# Patient Record
Sex: FEMALE | Race: OTHER | HISPANIC OR LATINO | Employment: UNEMPLOYED | URBAN - METROPOLITAN AREA
[De-identification: names, ages, dates, MRNs, and addresses within clinical notes are randomized per-mention and may not be internally consistent; named-entity substitution may affect disease eponyms.]

---

## 2019-01-21 ENCOUNTER — HOSPITAL ENCOUNTER (EMERGENCY)
Facility: HOSPITAL | Age: 44
Discharge: HOME | End: 2019-01-21
Attending: EMERGENCY MEDICINE | Admitting: EMERGENCY MEDICINE
Payer: COMMERCIAL

## 2019-01-21 ENCOUNTER — APPOINTMENT (EMERGENCY)
Dept: CT IMAGING | Facility: HOSPITAL | Age: 44
End: 2019-01-21
Payer: COMMERCIAL

## 2019-01-21 VITALS
WEIGHT: 137.79 LBS | DIASTOLIC BLOOD PRESSURE: 56 MMHG | SYSTOLIC BLOOD PRESSURE: 110 MMHG | HEART RATE: 71 BPM | TEMPERATURE: 98.7 F | OXYGEN SATURATION: 100 % | RESPIRATION RATE: 18 BRPM

## 2019-01-21 DIAGNOSIS — D50.9 MICROCYTIC ANEMIA: ICD-10-CM

## 2019-01-21 DIAGNOSIS — R07.9 RIGHT-SIDED CHEST PAIN: Primary | ICD-10-CM

## 2019-01-21 LAB
ALBUMIN SERPL BCP-MCNC: 4.2 G/DL (ref 3.5–5)
ALP SERPL-CCNC: 66 U/L (ref 46–116)
ALT SERPL W P-5'-P-CCNC: 26 U/L (ref 12–78)
ANION GAP SERPL CALCULATED.3IONS-SCNC: 10 MMOL/L (ref 4–13)
AST SERPL W P-5'-P-CCNC: 16 U/L (ref 5–45)
BACTERIA UR QL AUTO: ABNORMAL /HPF
BASOPHILS # BLD AUTO: 0.02 THOUSANDS/ΜL (ref 0–0.1)
BASOPHILS NFR BLD AUTO: 0 % (ref 0–1)
BILIRUB SERPL-MCNC: 0.2 MG/DL (ref 0.2–1)
BILIRUB UR QL STRIP: NEGATIVE
BUN SERPL-MCNC: 18 MG/DL (ref 5–25)
CALCIUM SERPL-MCNC: 8.5 MG/DL (ref 8.3–10.1)
CHLORIDE SERPL-SCNC: 103 MMOL/L (ref 100–108)
CLARITY UR: CLEAR
CO2 SERPL-SCNC: 25 MMOL/L (ref 21–32)
COLOR UR: YELLOW
CREAT SERPL-MCNC: 0.6 MG/DL (ref 0.6–1.3)
EOSINOPHIL # BLD AUTO: 0.04 THOUSAND/ΜL (ref 0–0.61)
EOSINOPHIL NFR BLD AUTO: 1 % (ref 0–6)
ERYTHROCYTE [DISTWIDTH] IN BLOOD BY AUTOMATED COUNT: 17.6 % (ref 11.6–15.1)
EXT PREG TEST URINE: NEGATIVE
GFR SERPL CREATININE-BSD FRML MDRD: 112 ML/MIN/1.73SQ M
GLUCOSE SERPL-MCNC: 108 MG/DL (ref 65–140)
GLUCOSE UR STRIP-MCNC: NEGATIVE MG/DL
HCT VFR BLD AUTO: 28.2 % (ref 34.8–46.1)
HGB BLD-MCNC: 8.4 G/DL (ref 11.5–15.4)
HGB UR QL STRIP.AUTO: ABNORMAL
IMM GRANULOCYTES # BLD AUTO: 0.03 THOUSAND/UL (ref 0–0.2)
IMM GRANULOCYTES NFR BLD AUTO: 0 % (ref 0–2)
KETONES UR STRIP-MCNC: NEGATIVE MG/DL
LEUKOCYTE ESTERASE UR QL STRIP: ABNORMAL
LIPASE SERPL-CCNC: 186 U/L (ref 73–393)
LYMPHOCYTES # BLD AUTO: 1.7 THOUSANDS/ΜL (ref 0.6–4.47)
LYMPHOCYTES NFR BLD AUTO: 23 % (ref 14–44)
MCH RBC QN AUTO: 21.7 PG (ref 26.8–34.3)
MCHC RBC AUTO-ENTMCNC: 29.8 G/DL (ref 31.4–37.4)
MCV RBC AUTO: 73 FL (ref 82–98)
MONOCYTES # BLD AUTO: 0.54 THOUSAND/ΜL (ref 0.17–1.22)
MONOCYTES NFR BLD AUTO: 7 % (ref 4–12)
NEUTROPHILS # BLD AUTO: 5.03 THOUSANDS/ΜL (ref 1.85–7.62)
NEUTS SEG NFR BLD AUTO: 69 % (ref 43–75)
NITRITE UR QL STRIP: NEGATIVE
NON-SQ EPI CELLS URNS QL MICRO: ABNORMAL /HPF
NRBC BLD AUTO-RTO: 0 /100 WBCS
OTHER STN SPEC: ABNORMAL
PH UR STRIP.AUTO: 6.5 [PH] (ref 4.5–8)
PLATELET # BLD AUTO: 416 THOUSANDS/UL (ref 149–390)
PMV BLD AUTO: 10 FL (ref 8.9–12.7)
POTASSIUM SERPL-SCNC: 3.7 MMOL/L (ref 3.5–5.3)
PROT SERPL-MCNC: 7.3 G/DL (ref 6.4–8.2)
PROT UR STRIP-MCNC: NEGATIVE MG/DL
RBC # BLD AUTO: 3.87 MILLION/UL (ref 3.81–5.12)
RBC #/AREA URNS AUTO: ABNORMAL /HPF
SODIUM SERPL-SCNC: 138 MMOL/L (ref 136–145)
SP GR UR STRIP.AUTO: <=1.005 (ref 1–1.03)
UROBILINOGEN UR QL STRIP.AUTO: 0.2 E.U./DL
WBC # BLD AUTO: 7.36 THOUSAND/UL (ref 4.31–10.16)
WBC #/AREA URNS AUTO: ABNORMAL /HPF

## 2019-01-21 PROCEDURE — 99284 EMERGENCY DEPT VISIT MOD MDM: CPT

## 2019-01-21 PROCEDURE — 36415 COLL VENOUS BLD VENIPUNCTURE: CPT | Performed by: EMERGENCY MEDICINE

## 2019-01-21 PROCEDURE — 96361 HYDRATE IV INFUSION ADD-ON: CPT

## 2019-01-21 PROCEDURE — 96374 THER/PROPH/DIAG INJ IV PUSH: CPT

## 2019-01-21 PROCEDURE — 71275 CT ANGIOGRAPHY CHEST: CPT

## 2019-01-21 PROCEDURE — 81001 URINALYSIS AUTO W/SCOPE: CPT | Performed by: EMERGENCY MEDICINE

## 2019-01-21 PROCEDURE — 83690 ASSAY OF LIPASE: CPT | Performed by: EMERGENCY MEDICINE

## 2019-01-21 PROCEDURE — 81025 URINE PREGNANCY TEST: CPT | Performed by: EMERGENCY MEDICINE

## 2019-01-21 PROCEDURE — 85025 COMPLETE CBC W/AUTO DIFF WBC: CPT | Performed by: EMERGENCY MEDICINE

## 2019-01-21 PROCEDURE — 96375 TX/PRO/DX INJ NEW DRUG ADDON: CPT

## 2019-01-21 PROCEDURE — 80053 COMPREHEN METABOLIC PANEL: CPT | Performed by: EMERGENCY MEDICINE

## 2019-01-21 RX ORDER — KETOROLAC TROMETHAMINE 30 MG/ML
15 INJECTION, SOLUTION INTRAMUSCULAR; INTRAVENOUS ONCE
Status: COMPLETED | OUTPATIENT
Start: 2019-01-21 | End: 2019-01-21

## 2019-01-21 RX ORDER — ONDANSETRON 2 MG/ML
4 INJECTION INTRAMUSCULAR; INTRAVENOUS ONCE
Status: COMPLETED | OUTPATIENT
Start: 2019-01-21 | End: 2019-01-21

## 2019-01-21 RX ORDER — FERROUS SULFATE 325(65) MG
325 TABLET ORAL DAILY
Qty: 30 TABLET | Refills: 0 | Status: SHIPPED | OUTPATIENT
Start: 2019-01-21 | End: 2019-02-20

## 2019-01-21 RX ORDER — VALACYCLOVIR HYDROCHLORIDE 1 G/1
1000 TABLET, FILM COATED ORAL 2 TIMES DAILY
COMMUNITY

## 2019-01-21 RX ORDER — NAPROXEN 500 MG/1
250 TABLET ORAL 2 TIMES DAILY WITH MEALS
COMMUNITY

## 2019-01-21 RX ADMIN — ONDANSETRON 4 MG: 2 INJECTION INTRAMUSCULAR; INTRAVENOUS at 18:47

## 2019-01-21 RX ADMIN — KETOROLAC TROMETHAMINE 15 MG: 30 INJECTION, SOLUTION INTRAMUSCULAR at 18:47

## 2019-01-21 RX ADMIN — IOHEXOL 85 ML: 350 INJECTION, SOLUTION INTRAVENOUS at 19:02

## 2019-01-21 RX ADMIN — SODIUM CHLORIDE 1000 ML: 0.9 INJECTION, SOLUTION INTRAVENOUS at 18:10

## 2019-01-21 NOTE — ED NOTES
Patient added she is having increased pain on left side upon inspiration  Started approx 2 weeks ago        Ericka Prince RN  01/21/19 1038

## 2019-01-21 NOTE — ED PROVIDER NOTES
History  Chief Complaint   Patient presents with    Flank Pain     Pt reports R flank pain for 2 weeks  She also reports nausea and jaundice  History provided by:  Patient   used: No    Chest Pain   Pain location:  R lateral chest  Pain quality: aching and pressure    Pain radiates to:  Does not radiate  Pain radiates to the back: no    Pain severity:  Moderate  Onset quality:  Gradual  Duration:  2 weeks  Timing:  Intermittent  Progression:  Waxing and waning  Chronicity:  New  Context: breathing    Relieved by:  None tried  Worsened by:  Nothing tried  Ineffective treatments:  None tried  Associated symptoms: no abdominal pain, no back pain, no cough, no diaphoresis, no dizziness, no dysphagia, no fatigue, no fever, no nausea, no palpitations, no shortness of breath, not vomiting and no weakness    Risk factors: no coronary artery disease and no hypertension        Prior to Admission Medications   Prescriptions Last Dose Informant Patient Reported? Taking?   naproxen (NAPROSYN) 500 mg tablet   Yes Yes   Sig: Take 250 mg by mouth 2 (two) times a day with meals   valACYclovir (VALTREX) 1,000 mg tablet   Yes Yes   Sig: Take 1,000 mg by mouth 2 (two) times a day      Facility-Administered Medications: None       History reviewed  No pertinent past medical history  History reviewed  No pertinent surgical history  History reviewed  No pertinent family history  I have reviewed and agree with the history as documented  Social History   Substance Use Topics    Smoking status: Never Smoker    Smokeless tobacco: Never Used    Alcohol use No        Review of Systems   Constitutional: Negative for activity change, appetite change, chills, diaphoresis, fatigue and fever  HENT: Negative for congestion, dental problem, ear discharge, facial swelling, nosebleeds, rhinorrhea, sinus pressure and trouble swallowing      Eyes: Negative for photophobia, discharge, itching and visual disturbance  Respiratory: Negative for cough, choking, chest tightness and shortness of breath  Cardiovascular: Positive for chest pain  Negative for palpitations and leg swelling  Gastrointestinal: Negative for abdominal distention, abdominal pain, constipation, diarrhea, nausea and vomiting  Endocrine: Negative for polydipsia and polyphagia  Genitourinary: Negative for decreased urine volume, difficulty urinating, dysuria, flank pain, frequency, hematuria, vaginal bleeding and vaginal discharge  Musculoskeletal: Negative for back pain, gait problem, joint swelling, neck pain and neck stiffness  Skin: Negative for color change and rash  Neurological: Negative for dizziness, facial asymmetry, speech difficulty, weakness and light-headedness  Psychiatric/Behavioral: Negative for agitation and behavioral problems  The patient is not nervous/anxious and is not hyperactive  All other systems reviewed and are negative  Physical Exam  Physical Exam   Constitutional: She is oriented to person, place, and time  She appears well-developed and well-nourished  No distress  HENT:   Head: Normocephalic and atraumatic  Eyes: Pupils are equal, round, and reactive to light  EOM are normal    Neck: Normal range of motion  Neck supple  Cardiovascular: Normal rate, regular rhythm and normal heart sounds  No murmur heard  Pulmonary/Chest: Effort normal and breath sounds normal  No respiratory distress  She has no wheezes  She has no rales  Abdominal: Soft  Bowel sounds are normal  She exhibits no distension  There is no tenderness  There is no rebound and no guarding  Musculoskeletal: Normal range of motion  She exhibits no edema or deformity  Tender over right trapezius   Lymphadenopathy:     She has no cervical adenopathy  Neurological: She is alert and oriented to person, place, and time  No cranial nerve deficit  She exhibits normal muscle tone   Coordination normal    Skin: Capillary refill takes less than 2 seconds  No rash noted  No erythema  Psychiatric: She has a normal mood and affect  Her behavior is normal    Nursing note and vitals reviewed        Vital Signs  ED Triage Vitals [01/21/19 1734]   Temperature Pulse Respirations Blood Pressure SpO2   98 7 °F (37 1 °C) 88 18 115/54 100 %      Temp Source Heart Rate Source Patient Position - Orthostatic VS BP Location FiO2 (%)   Oral Monitor Sitting Left arm --      Pain Score       7           Vitals:    01/21/19 1734 01/21/19 1942   BP: 115/54 110/56   Pulse: 88 71   Patient Position - Orthostatic VS: Sitting Lying       Visual Acuity      ED Medications  Medications   sodium chloride 0 9 % bolus 1,000 mL (0 mL Intravenous Stopped 1/21/19 2005)   ketorolac (TORADOL) injection 15 mg (15 mg Intravenous Given 1/21/19 1847)   ondansetron (ZOFRAN) injection 4 mg (4 mg Intravenous Given 1/21/19 1847)   iohexol (OMNIPAQUE) 350 MG/ML injection (MULTI-DOSE) 85 mL (85 mL Intravenous Given 1/21/19 1902)       Diagnostic Studies  Results Reviewed     Procedure Component Value Units Date/Time    Urine Microscopic [[id number]]  (Abnormal) Collected:  01/21/19 1842    Lab Status:  Final result Specimen:  Urine from Urine, Clean Catch Updated:  01/21/19 1906     RBC, UA 0-1 (A) /hpf      WBC, UA None Seen /hpf      Epithelial Cells Occasional /hpf      Bacteria, UA Occasional /hpf      OTHER OBSERVATIONS Transitional Epithelial Cells    UA w Reflex to Microscopic w Reflex to Culture [[id number]]  (Abnormal) Collected:  01/21/19 1842    Lab Status:  Final result Specimen:  Urine from Urine, Clean Catch Updated:  01/21/19 1853     Color, UA Yellow     Clarity, UA Clear     Specific Gravity, UA <=1 005     pH, UA 6 5     Leukocytes, UA Trace (A)     Nitrite, UA Negative     Protein, UA Negative mg/dl      Glucose, UA Negative mg/dl      Ketones, UA Negative mg/dl      Urobilinogen, UA 0 2 E U /dl      Bilirubin, UA Negative     Blood, UA Trace-Intact (A) Comprehensive metabolic panel [[id number]] Collected:  01/21/19 1810    Lab Status:  Final result Specimen:  Blood from Arm, Right Updated:  01/21/19 1848     Sodium 138 mmol/L      Potassium 3 7 mmol/L      Chloride 103 mmol/L      CO2 25 mmol/L      ANION GAP 10 mmol/L      BUN 18 mg/dL      Creatinine 0 60 mg/dL      Glucose 108 mg/dL      Calcium 8 5 mg/dL      AST 16 U/L      ALT 26 U/L      Alkaline Phosphatase 66 U/L      Total Protein 7 3 g/dL      Albumin 4 2 g/dL      Total Bilirubin 0 20 mg/dL      eGFR 112 ml/min/1 73sq m     Narrative:         National Kidney Disease Education Program recommendations are as follows:  GFR calculation is accurate only with a steady state creatinine  Chronic Kidney disease less than 60 ml/min/1 73 sq  meters  Kidney failure less than 15 ml/min/1 73 sq  meters      Lipase [[id number]]  (Normal) Collected:  01/21/19 1810    Lab Status:  Final result Specimen:  Blood from Arm, Right Updated:  01/21/19 1848     Lipase 186 u/L     POCT pregnancy, urine [[id number]]  (Normal) Resulted:  01/21/19 1846    Lab Status:  Final result Updated:  01/21/19 1846     EXT PREG TEST UR (Ref: Negative) negative    CBC and differential [[id number]]  (Abnormal) Collected:  01/21/19 1810    Lab Status:  Final result Specimen:  Blood from Arm, Right Updated:  01/21/19 1826     WBC 7 36 Thousand/uL      RBC 3 87 Million/uL      Hemoglobin 8 4 (L) g/dL      Hematocrit 28 2 (L) %      MCV 73 (L) fL      MCH 21 7 (L) pg      MCHC 29 8 (L) g/dL      RDW 17 6 (H) %      MPV 10 0 fL      Platelets 100 (H) Thousands/uL      nRBC 0 /100 WBCs      Neutrophils Relative 69 %      Immat GRANS % 0 %      Lymphocytes Relative 23 %      Monocytes Relative 7 %      Eosinophils Relative 1 %      Basophils Relative 0 %      Neutrophils Absolute 5 03 Thousands/µL      Immature Grans Absolute 0 03 Thousand/uL      Lymphocytes Absolute 1 70 Thousands/µL      Monocytes Absolute 0 54 Thousand/µL      Eosinophils Absolute 0 04 Thousand/µL      Basophils Absolute 0 02 Thousands/µL                  CTA ED chest PE study   Final Result by Blanco Bishop MD (01/21 1929)      No evidence of pulmonary embolus  Workstation performed: [id number]                    Procedures  Procedures       Phone Contacts  ED Phone Contact    ED Course                               MDM  Number of Diagnoses or Management Options  Microcytic anemia: new and requires workup  Right-sided chest pain: new and requires workup  Diagnosis management comments: Right-sided chest pain that is described as pleuritic in nature  Family history of blood clots, no personal history  Patient denies any abdominal pain, pain on urination, blood in urine, blood in stool  States history of anemia and has been on iron pills in the past     Vital signs stable  Patient overall appears well  Tenderness over the right trapezius muscle  No CVA tenderness, no abdominal tenderness  No left-sided chest pain, no shortness of breath  Does state pleuritic nature of chest pain in setting of significant family history blood counts  CT PE obtained which showed no acute findings  Microcytic anemia  No urinary tract infection, normal lipase and normal CMP  Patient states history of anemia and has been on iron pills in the past   Will restart iron at this time and referred to primary care physician for recheck of hemoglobin  No tachycardia or hypotension, no symptoms of anemia, no history of coronary artery disease, no indication for transfusion  Will prescribe iron pills at home and have patient follow up with primary care physician for recheck and further evaluation as deemed necessary  Patient does state a history of heavy periods which is likely cause of patient's anemia  No active bleeding at this time  Had a long discussion with patient and her daughter regarding ER findings and recommended follow-up    Tylenol recommended for right-sided trapezius pain, likely muscle strain in setting of negative ER workup  No left-sided chest pain or shortness of breath to suggest ACS  Amount and/or Complexity of Data Reviewed  Clinical lab tests: ordered and reviewed  Tests in the radiology section of CPT®: ordered and reviewed  Tests in the medicine section of CPT®: ordered and reviewed    Risk of Complications, Morbidity, and/or Mortality  Presenting problems: moderate  Diagnostic procedures: moderate  Management options: moderate    Patient Progress  Patient progress: stable    CritCare Time    Disposition  Final diagnoses:   Right-sided chest pain   Microcytic anemia     Time reflects when diagnosis was documented in both MDM as applicable and the Disposition within this note     Time User Action Codes Description Comment    1/21/2019  7:52 PM Marylee Marshal Add [R07 9] Right-sided chest pain     1/21/2019  7:52 PM Marylee Marshal Add [D50 9] Microcytic anemia       ED Disposition     ED Disposition Condition Comment    Discharge  [address] [facility] discharge to home/self care  Condition at discharge: Good        Follow-up Information     Follow up With Specialties Details Why Contact Info    Primary care doctor  In 3 days For repeat CBC, microcytic anemia           Discharge Medication List as of 1/21/2019  7:53 PM      START taking these medications    Details   ferrous sulfate 325 (65 Fe) mg tablet Take 1 tablet (325 mg total) by mouth daily for 30 days, Starting Mon 1/21/2019, Until Wed 2/20/2019, Print         CONTINUE these medications which have NOT CHANGED    Details   naproxen (NAPROSYN) 500 mg tablet Take 250 mg by mouth 2 (two) times a day with meals, Historical Med      valACYclovir (VALTREX) 1,000 mg tablet Take 1,000 mg by mouth 2 (two) times a day, Historical Med           No discharge procedures on file      ED Provider  Electronically Signed by           Renate Hudson MD  01/21/19 2046

## 2019-01-22 NOTE — DISCHARGE INSTRUCTIONS
Anemia   LO QUE NECESITA SABER:   La anemia es cuando el número de glóbulos rojos o la cantidad de [employer] glóbulos rojos es baja  La hemoglobina es calvin proteína que ayuda a transportar el oxígeno a través de bland cuerpo  Los glóbulos rojos usan el josh para crear hemoglobina  La anemia podría desarrollarse si bland cuerpo no tiene suficiente josh  También podría desarrollarse si bland cuerpo no produce suficientes glóbulos rojos o si ellos mueren antes de que bland cuerpo pueda producirlos  INSTRUCCIONES SOBRE EL EMMY HOSPITALARIA:   Llame al 911 o kayla que alguien llame al 911 en cualquiera de los siguientes casos:   · Usted pierde el conocimiento  · Usted tiene un dolor intenso en el pecho  Busque atención médica de inmediato si:   · Usted tiene evacuaciones intestinales oscuras o con sadie  Pregúntele a bland Rogue Beady vitaminas y minerales son adecuados para usted  · Sosa síntomas empeoran, aún después del [place]  · Usted tiene preguntas o inquietudes acerca de bland condición o cuidado  Medicamentos:   · Suplementos de josh o ácido fólico  ayudan a aumentar sosa glóbulos rojos y los niveles de hemoglobina  · Inyecciones de vitamina B12  podrían ayudar a aumentar el conteo de sosa glóbulos rojos y a disminuir sosa síntomas  Pregunte a bland médico cómo se inyecta la B12  · [place] sosa medicamentos makeda se le haya indicado  Consulte con bland médico si usted arash que bland medicamento no le está ayudando o si presenta efectos secundarios  Infórmele si es alérgico a algún medicamento  Mantenga calvin lista actualizada de los [place], las vitaminas y los productos herbales que jeison  Incluya los siguientes datos de los medicamentos: cantidad, frecuencia y motivo de administración  Traiga con usted la lista o los envases de la píldoras a sosa citas de seguimiento  Lleve la lista de los medicamentos con usted en bonnie de calvin emergencia    Evite la anemia:  Consuma alimentos sanos altos en josh y vitamina C  Estanislado Ryan, vegetales de hojas myla oscuro y frijoles son altos en josh y proteína  La vitamina C ayuda a escalante cuerpo a absorber el josh  Los [employer] en vitamina C incluyen naranjas y otros cítricos  [place] a escalante médico calvin lista de otros alimentos altos en josh y vitamina C  Pregunte si usted necesita llevar calvin dieta especial    Jaki Mace a venice consultas de control con escalante médico según le indicaron  Anote venice preguntas para que se acuerde de hacerlas abdon venice visitas  © 2017 [address] Information is for End User's use only and may not be sold, redistributed or otherwise used for commercial purposes  All illustrations and images included in CareNotes® are the copyrighted property of A D A M , Inc  or Nomi Bob  Esta información es sólo para uso en educación  Escalante intención no es darle un consejo médico sobre enfermedades o tratamientos  Colsulte con escalante Ozie Sharp farmacéutico antes de seguir cualquier régimen médico para saber si es seguro y efectivo para usted

## 2021-05-07 ENCOUNTER — IMMUNIZATIONS (OUTPATIENT)
Dept: FAMILY MEDICINE CLINIC | Facility: HOSPITAL | Age: 46
End: 2021-05-07

## 2021-05-07 DIAGNOSIS — Z23 ENCOUNTER FOR IMMUNIZATION: Primary | ICD-10-CM

## 2021-05-07 PROCEDURE — 91300 SARS-COV-2 / COVID-19 MRNA VACCINE (PFIZER-BIONTECH) 30 MCG: CPT

## 2021-05-07 PROCEDURE — 0001A SARS-COV-2 / COVID-19 MRNA VACCINE (PFIZER-BIONTECH) 30 MCG: CPT

## 2021-05-29 ENCOUNTER — IMMUNIZATIONS (OUTPATIENT)
Dept: FAMILY MEDICINE CLINIC | Facility: HOSPITAL | Age: 46
End: 2021-05-29

## 2021-05-29 DIAGNOSIS — Z23 ENCOUNTER FOR IMMUNIZATION: Primary | ICD-10-CM

## 2021-05-29 PROCEDURE — 91300 SARS-COV-2 / COVID-19 MRNA VACCINE (PFIZER-BIONTECH) 30 MCG: CPT

## 2021-05-29 PROCEDURE — 0002A SARS-COV-2 / COVID-19 MRNA VACCINE (PFIZER-BIONTECH) 30 MCG: CPT

## 2021-07-01 ENCOUNTER — HOSPITAL ENCOUNTER (EMERGENCY)
Facility: HOSPITAL | Age: 46
Discharge: HOME | End: 2021-07-01
Attending: EMERGENCY MEDICINE
Payer: COMMERCIAL

## 2021-07-01 ENCOUNTER — APPOINTMENT (EMERGENCY)
Dept: CT IMAGING | Facility: HOSPITAL | Age: 46
End: 2021-07-01
Payer: COMMERCIAL

## 2021-07-01 VITALS
RESPIRATION RATE: 18 BRPM | TEMPERATURE: 98 F | HEART RATE: 81 BPM | SYSTOLIC BLOOD PRESSURE: 125 MMHG | HEIGHT: 61 IN | BODY MASS INDEX: 27.94 KG/M2 | DIASTOLIC BLOOD PRESSURE: 71 MMHG | OXYGEN SATURATION: 100 % | WEIGHT: 148 LBS

## 2021-07-01 DIAGNOSIS — K62.89 RECTAL PAIN: Primary | ICD-10-CM

## 2021-07-01 LAB
ALBUMIN SERPL BCP-MCNC: 4.4 G/DL (ref 3.4–4.8)
ALP SERPL-CCNC: 82.5 U/L (ref 35–140)
ALT SERPL W P-5'-P-CCNC: 35 U/L (ref 5–54)
ANION GAP SERPL CALCULATED.3IONS-SCNC: 9 MMOL/L (ref 4–13)
AST SERPL W P-5'-P-CCNC: 27 U/L (ref 15–41)
BASOPHILS # BLD AUTO: 0.01 THOUSANDS/ΜL (ref 0–0.1)
BASOPHILS NFR BLD AUTO: 0 % (ref 0–1)
BILIRUB DIRECT SERPL-MCNC: 0.1 MG/DL (ref 0–0.3)
BILIRUB SERPL-MCNC: 0.41 MG/DL (ref 0.3–1.2)
BUN SERPL-MCNC: 14 MG/DL (ref 6–20)
CALCIUM SERPL-MCNC: 9.2 MG/DL (ref 8.4–10.2)
CHLORIDE SERPL-SCNC: 105 MMOL/L (ref 96–108)
CO2 SERPL-SCNC: 25 MMOL/L (ref 22–33)
CREAT SERPL-MCNC: 0.67 MG/DL (ref 0.4–1.1)
EOSINOPHIL # BLD AUTO: 0.05 THOUSAND/ΜL (ref 0–0.61)
EOSINOPHIL NFR BLD AUTO: 1 % (ref 0–6)
ERYTHROCYTE [DISTWIDTH] IN BLOOD BY AUTOMATED COUNT: 16.8 % (ref 11.6–15.1)
EXT PREG TEST URINE: NEGATIVE
EXT. CONTROL ED NAV: NORMAL
GFR SERPL CREATININE-BSD FRML MDRD: 106 ML/MIN/1.73SQ M
GLUCOSE SERPL-MCNC: 107 MG/DL (ref 65–140)
HCT VFR BLD AUTO: 33.7 % (ref 34.8–46.1)
HGB BLD-MCNC: 10.9 G/DL (ref 11.5–15.4)
IMM GRANULOCYTES # BLD AUTO: 0.01 THOUSAND/UL (ref 0–0.2)
IMM GRANULOCYTES NFR BLD AUTO: 0 % (ref 0–2)
LIPASE SERPL-CCNC: 24 U/L (ref 13–60)
LYMPHOCYTES # BLD AUTO: 2.07 THOUSANDS/ΜL (ref 0.6–4.47)
LYMPHOCYTES NFR BLD AUTO: 32 % (ref 14–44)
MCH RBC QN AUTO: 26.3 PG (ref 26.8–34.3)
MCHC RBC AUTO-ENTMCNC: 32.3 G/DL (ref 31.4–37.4)
MCV RBC AUTO: 81 FL (ref 82–98)
MONOCYTES # BLD AUTO: 0.54 THOUSAND/ΜL (ref 0.17–1.22)
MONOCYTES NFR BLD AUTO: 8 % (ref 4–12)
NEUTROPHILS # BLD AUTO: 3.75 THOUSANDS/ΜL (ref 1.85–7.62)
NEUTS SEG NFR BLD AUTO: 59 % (ref 43–75)
PLATELET # BLD AUTO: 341 THOUSANDS/UL (ref 149–390)
PMV BLD AUTO: 11.1 FL (ref 8.9–12.7)
POTASSIUM SERPL-SCNC: 3.6 MMOL/L (ref 3.5–5)
PROT SERPL-MCNC: 7.4 G/DL (ref 6.4–8.3)
RBC # BLD AUTO: 4.14 MILLION/UL (ref 3.81–5.12)
SODIUM SERPL-SCNC: 139 MMOL/L (ref 133–145)
WBC # BLD AUTO: 6.43 THOUSAND/UL (ref 4.31–10.16)

## 2021-07-01 PROCEDURE — 80076 HEPATIC FUNCTION PANEL: CPT | Performed by: EMERGENCY MEDICINE

## 2021-07-01 PROCEDURE — 85025 COMPLETE CBC W/AUTO DIFF WBC: CPT | Performed by: EMERGENCY MEDICINE

## 2021-07-01 PROCEDURE — 83690 ASSAY OF LIPASE: CPT | Performed by: EMERGENCY MEDICINE

## 2021-07-01 PROCEDURE — 96374 THER/PROPH/DIAG INJ IV PUSH: CPT

## 2021-07-01 PROCEDURE — 36415 COLL VENOUS BLD VENIPUNCTURE: CPT | Performed by: EMERGENCY MEDICINE

## 2021-07-01 PROCEDURE — 99284 EMERGENCY DEPT VISIT MOD MDM: CPT

## 2021-07-01 PROCEDURE — 80048 BASIC METABOLIC PNL TOTAL CA: CPT | Performed by: EMERGENCY MEDICINE

## 2021-07-01 PROCEDURE — 74177 CT ABD & PELVIS W/CONTRAST: CPT

## 2021-07-01 PROCEDURE — 99284 EMERGENCY DEPT VISIT MOD MDM: CPT | Performed by: EMERGENCY MEDICINE

## 2021-07-01 PROCEDURE — 81025 URINE PREGNANCY TEST: CPT | Performed by: EMERGENCY MEDICINE

## 2021-07-01 RX ORDER — POLYETHYLENE GLYCOL 3350 17 G/17G
17 POWDER, FOR SOLUTION ORAL DAILY
Qty: 507 G | Refills: 0 | Status: SHIPPED | OUTPATIENT
Start: 2021-07-01

## 2021-07-01 RX ORDER — LIDOCAINE HYDROCHLORIDE 20 MG/ML
1 JELLY TOPICAL ONCE
Status: COMPLETED | OUTPATIENT
Start: 2021-07-01 | End: 2021-07-01

## 2021-07-01 RX ORDER — KETOROLAC TROMETHAMINE 30 MG/ML
15 INJECTION, SOLUTION INTRAMUSCULAR; INTRAVENOUS ONCE
Status: COMPLETED | OUTPATIENT
Start: 2021-07-01 | End: 2021-07-01

## 2021-07-01 RX ADMIN — IOHEXOL 100 ML: 350 INJECTION, SOLUTION INTRAVENOUS at 17:17

## 2021-07-01 RX ADMIN — LIDOCAINE HYDROCHLORIDE 1 APPLICATION: 20 JELLY TOPICAL at 16:48

## 2021-07-01 RX ADMIN — KETOROLAC TROMETHAMINE 15 MG: 30 INJECTION, SOLUTION INTRAMUSCULAR at 18:05

## 2021-07-01 NOTE — ED PROVIDER NOTES
History  Chief Complaint   Patient presents with    Rectal Pain     Hx of hemmorhoids     51-year-old primarily Pashto-speaking female that presents to the emergency department with rectal pain  Patient states that has been present for >6 months worsening over the last week  Patient has been evaluated by other physicians and diagnosed with an internal hemorrhoid  Seen in January  Has not had surgery  Patient states this is painful  Worse when putting in suppository  Patient has been taking a suppository without relief of her pain as well as naproxen  Worse with defecation  Patient denies any bloody bowel movements, fevers, chills, diarrhea  Patient is having 1 solid bowel movement per day  Patient is not taking any stool softeners  No history of autoimmune diseases  Has history of nonalcoholic steatohepatitis  Last menstrual period was within the last month  Denies dysuria, hematuria, increased frequency of urination, vaginal discharge  History obtained via chart review and  iPad    Objective:  well-appearing 51-year-old female presenting with rectal pain  Patient states the pain is deep  Patient has no external hemorrhoid  I do not see a obvious anal tear on physical examination  There is a small palpable internal hemorrhoid  Normal genitalia  No abdominal tenderness  Clear to auscultation bilaterally  Regular rate and rhythm without murmurs  Skin is without rashes  Pain on palpation of left medial superior buttocks, but no sign of cyst     Assessment/plan: Will evaluate for acute intra-abdominal/intrapelvic processes such as infectious process, mass, fistula, diverticulitis (though no bloody stool)  No obvious external hemorrhoid or tear of the anus that would suggest a cause of her pain  Possible that I am missing a small tear as the patient is quite clenched while I am examining her  No decrease in pain after topical anesthesia   Not likely IBD as no bloody stools  Blood work without significant findings  CT scan with contrast with distended uterus, though probably physiological as upcoming menses  Will have patient follow with OB/Gyn to insure this is not the cause of the pain  Will have patient follow with [place]-rectal surgery for possible small internal hemorrhoid, though the patient's pain is unlikely to be related to an internal hemorrhoid  Possible small anal tear I am missing  Will place patient on stool softner  Return precuations  Given  Abdominal Pain  Pain location: Rectum  Pain quality: sharp    Pain radiates to:  Does not radiate  Pain severity:  Severe  Onset quality:  Gradual  Timing:  Constant  Progression:  Worsening  Chronicity:  Chronic  Relieved by:  Nothing  Worsened by: Bowel movements  Associated symptoms: no chills, no constipation, no fever, no hematuria, no shortness of breath, no vaginal bleeding, no vaginal discharge and no vomiting        Prior to Admission Medications   Prescriptions Last Dose Informant Patient Reported? Taking?   ferrous sulfate 325 (65 Fe) mg tablet   No No   Sig: Take 1 tablet (325 mg total) by mouth daily for 30 days   naproxen (NAPROSYN) 500 mg tablet   Yes No   Sig: Take 250 mg by mouth 2 (two) times a day with meals   valACYclovir (VALTREX) 1,000 mg tablet   Yes No   Sig: Take 1,000 mg by mouth 2 (two) times a day      Facility-Administered Medications: None       History reviewed  No pertinent past medical history  History reviewed  No pertinent surgical history  History reviewed  No pertinent family history  I have reviewed and agree with the history as documented  E-Cigarette/Vaping     E-Cigarette/Vaping Substances     Social History     Tobacco Use    Smoking status: Never Smoker    Smokeless tobacco: Never Used   Substance Use Topics    Alcohol use: No    Drug use: No       Review of Systems   Constitutional: Negative for chills and fever     Respiratory: Negative for shortness of breath  Gastrointestinal: Positive for rectal pain  Negative for constipation and vomiting  Genitourinary: Negative for hematuria, vaginal bleeding and vaginal discharge  All other systems reviewed and are negative  Physical Exam  Physical Exam  Vitals and nursing note reviewed  Constitutional:       General: She is not in acute distress  Appearance: Normal appearance  She is not ill-appearing  HENT:      Head: Normocephalic and atraumatic  Right Ear: External ear normal       Left Ear: External ear normal       Nose: Nose normal       Mouth/Throat:      Mouth: Mucous membranes are moist    Eyes:      General:         Right eye: No discharge  Left eye: No discharge  Conjunctiva/sclera: Conjunctivae normal    Cardiovascular:      Rate and Rhythm: Normal rate and regular rhythm  Pulses: Normal pulses  Heart sounds: No murmur heard  Pulmonary:      Effort: Pulmonary effort is normal       Breath sounds: Normal breath sounds  Abdominal:      General: Abdomen is flat  There is no distension  Tenderness: There is no abdominal tenderness  Genitourinary:     General: Normal vulva  Vagina: No vaginal discharge  Comments: No external hemorrhoid  No anal tear noted, though patient is clenching  Small mass palpated in the rectal vault  No blood  Musculoskeletal:         General: Normal range of motion  Cervical back: Normal range of motion  Skin:     General: Skin is warm  Capillary Refill: Capillary refill takes less than 2 seconds  Findings: No rash  Neurological:      General: No focal deficit present  Mental Status: She is alert  Mental status is at baseline     Psychiatric:         Mood and Affect: Mood normal          Behavior: Behavior normal          Vital Signs  ED Triage Vitals [07/01/21 1607]   Temperature Pulse Respirations Blood Pressure SpO2   98 °F (36 7 °C) 81 18 125/71 100 %      Temp src Heart Rate Source Patient Position - Orthostatic VS BP Location FiO2 (%)   -- -- -- -- --      Pain Score       --           Vitals:    07/01/21 1607   BP: 125/71   Pulse: 81         Visual Acuity      ED Medications  Medications   lidocaine (URO-JET) 2 % urethral/mucosal gel 1 application (1 application Topical Given 7/1/21 1648)   iohexol (OMNIPAQUE) 350 MG/ML injection (MULTI-DOSE) 100 mL (100 mL Intravenous Given 7/1/21 1717)   ketorolac (TORADOL) injection 15 mg (15 mg Intravenous Given 7/1/21 1805)       Diagnostic Studies  Results Reviewed     Procedure Component Value Units Date/Time    Basic metabolic panel [[id number]] Collected: 07/01/21 1640    Lab Status: Final result Specimen: Blood from Arm, Right Updated: 07/01/21 1704     Sodium 139 mmol/L      Potassium 3 6 mmol/L      Chloride 105 mmol/L      CO2 25 mmol/L      ANION GAP 9 mmol/L      BUN 14 mg/dL      Creatinine 0 67 mg/dL      Glucose 107 mg/dL      Calcium 9 2 mg/dL      eGFR 106 ml/min/1 73sq m     Narrative:      Bryan guidelines for Chronic Kidney Disease (CKD):     Stage 1 with normal or high GFR (GFR > 90 mL/min/1 73 square meters)    Stage 2 Mild CKD (GFR = 60-89 mL/min/1 73 square meters)    Stage 3A Moderate CKD (GFR = 45-59 mL/min/1 73 square meters)    Stage 3B Moderate CKD (GFR = 30-44 mL/min/1 73 square meters)    Stage 4 Severe CKD (GFR = 15-29 mL/min/1 73 square meters)    Stage 5 End Stage CKD (GFR <15 mL/min/1 73 square meters)  Note: GFR calculation is accurate only with a steady state creatinine    Hepatic function panel [[id number]]  (Normal) Collected: 07/01/21 1640    Lab Status: Final result Specimen: Blood from Arm, Right Updated: 07/01/21 1704     Total Bilirubin 0 41 mg/dL      Bilirubin, Direct 0 10 mg/dL      Alkaline Phosphatase 82 5 U/L      AST 27 U/L      ALT 35 U/L      Total Protein 7 4 g/dL      Albumin 4 4 g/dL     Lipase [[id number]]  (Normal) Collected: 07/01/21 1640    Lab Status: Final result Specimen: Blood from Arm, Right Updated: 07/01/21 1704     Lipase 24 u/L     CBC and differential [[id number]]  (Abnormal) Collected: 07/01/21 1640    Lab Status: Final result Specimen: Blood from Arm, Right Updated: 07/01/21 1700     WBC 6 43 Thousand/uL      RBC 4 14 Million/uL      Hemoglobin 10 9 g/dL      Hematocrit 33 7 %      MCV 81 fL      MCH 26 3 pg      MCHC 32 3 g/dL      RDW 16 8 %      MPV 11 1 fL      Platelets 433 Thousands/uL      Neutrophils Relative 59 %      Immat GRANS % 0 %      Lymphocytes Relative 32 %      Monocytes Relative 8 %      Eosinophils Relative 1 %      Basophils Relative 0 %      Neutrophils Absolute 3 75 Thousands/µL      Immature Grans Absolute 0 01 Thousand/uL      Lymphocytes Absolute 2 07 Thousands/µL      Monocytes Absolute 0 54 Thousand/µL      Eosinophils Absolute 0 05 Thousand/µL      Basophils Absolute 0 01 Thousands/µL     POCT pregnancy, urine [[id number]]  (Normal) Resulted: 07/01/21 1638    Lab Status: Final result Updated: 07/01/21 1638     EXT PREG TEST UR (Ref: Negative) Negative     Control valid                 CT abdomen pelvis with contrast   Final Result by Lucia Son MD (07/01 1804)      No discrete colonic or rectal wall thickening identified  Endometrial distention with fluid is likely physiologic if patient is premenopausal       Fatty infiltration of the liver  Workstation performed: [id number]                    Procedures  Procedures         ED Course  ED Course as of Jul 02 0135   Thu Jul 01, 2021   1705 Increased from previous     Hemoglobin(!): 10 9                             SBIRT 20yo+      Most Recent Value   SBIRT (25 yo +)   In order to provide better care to our patients, we are screening all of our patients for alcohol and drug use  Would it be okay to ask you these screening questions? Yes Filed at: 07/01/2021 1650   Initial Alcohol Screen: US AUDIT-C    1   How often do you have a drink containing alcohol?  0 Filed at: 07/01/2021 1650   2  How many drinks containing alcohol do you have on a typical day you are drinking? 0 Filed at: 07/01/2021 1650   3a  Male UNDER 65: How often do you have five or more drinks on one occasion? 0 Filed at: 07/01/2021 1650   3b  FEMALE Any Age, or MALE 65+: How often do you have 4 or more drinks on one occassion? 0 Filed at: 07/01/2021 1650   Audit-C Score  0 Filed at: 07/01/2021 1650   KARO: How many times in the past year have you    Used an illegal drug or used a prescription medication for non-medical reasons? Never Filed at: 07/01/2021 1650                    MDM  Number of Diagnoses or Management Options  Rectal pain: new and requires workup     Amount and/or Complexity of Data Reviewed  Clinical lab tests: ordered and reviewed  Tests in the radiology section of CPT®: ordered and reviewed  Review and summarize past medical records: yes  Independent visualization of images, tracings, or specimens: yes    Risk of Complications, Morbidity, and/or Mortality  Presenting problems: moderate  Diagnostic procedures: moderate  Management options: moderate        Disposition  Final diagnoses:   Rectal pain     Time reflects when diagnosis was documented in both MDM as applicable and the Disposition within this note     Time User Action Codes Description Comment    7/1/2021  6:28 PM Isaac Skye Add [K62 89] Rectal pain     7/1/2021  6:31 PM Isaac Skye Add [Q51 9] Uterine anomaly     7/1/2021  6:31 PM Isaac Skye Remove [Q51 9] Uterine anomaly       ED Disposition     ED Disposition Condition Date/Time Comment    Discharge Stable Thu Jul 1, 2021  6:28 PM Thania Greenwood discharge to home/self care              Follow-up Information     Follow up With Specialties Details Why Contact Info Additional Information    Ari Sifuentes MD Colon and Rectal Surgery, Multidisciplinary Schedule an appointment as soon as possible for a visit  regarding your internal hemorrhoid [address]   [address] [facility] Obstetrics and Gynecology Schedule an appointment as soon as possible for a visit  regarding uterine distension [address]  [place] [address] [phone] [address] [facility], [address], [place], [address] [place] [address], [phone]          Discharge Medication List as of 7/1/2021  6:35 PM      START taking these medications    Details   polyethylene glycol (GLYCOLAX) 17 GM/SCOOP powder Take 17 g by mouth daily, Starting u 7/1/2021, Print         CONTINUE these medications which have NOT CHANGED    Details   ferrous sulfate 325 (65 Fe) mg tablet Take 1 tablet (325 mg total) by mouth daily for 30 days, Starting Mon 1/21/2019, Until Wed 2/20/2019, Print      naproxen (NAPROSYN) 500 mg tablet Take 250 mg by mouth 2 (two) times a day with meals, Historical Med      valACYclovir (VALTREX) 1,000 mg tablet Take 1,000 mg by mouth 2 (two) times a day, Historical Med           No discharge procedures on file      PDMP Review     None          ED Provider  Electronically Signed by           Souleymane Del Rio DO  07/02/21 7631

## 2021-07-01 NOTE — DISCHARGE INSTRUCTIONS
Continue taking the naproxen as needed for your pain  You may stop taking the suppositories  Follow-up with Colorectal surgery regarding your internal hemorrhoid  Take MiraLax once daily  Follow-up with OBGYN regarding your uterine distension  This is likely physiologic because you are getting close to having your menses, however I would prefer that she see the OBGYN to exclude that this is a cause of your pain  Come back to emergency department if you have new or worsening symptoms

## 2021-07-02 ENCOUNTER — DOCUMENTATION (OUTPATIENT)
Dept: EMERGENCY DEPT | Facility: HOSPITAL | Age: 46
End: 2021-07-02

## 2021-09-12 ENCOUNTER — HOSPITAL ENCOUNTER (EMERGENCY)
Facility: HOSPITAL | Age: 46
Discharge: HOME | End: 2021-09-12
Attending: EMERGENCY MEDICINE | Admitting: EMERGENCY MEDICINE
Payer: COMMERCIAL

## 2021-09-12 ENCOUNTER — APPOINTMENT (EMERGENCY)
Dept: CT IMAGING | Facility: HOSPITAL | Age: 46
End: 2021-09-12
Payer: COMMERCIAL

## 2021-09-12 VITALS
RESPIRATION RATE: 12 BRPM | SYSTOLIC BLOOD PRESSURE: 138 MMHG | OXYGEN SATURATION: 100 % | HEART RATE: 72 BPM | TEMPERATURE: 97.5 F | DIASTOLIC BLOOD PRESSURE: 70 MMHG

## 2021-09-12 DIAGNOSIS — D50.9 IRON DEFICIENCY ANEMIA, UNSPECIFIED IRON DEFICIENCY ANEMIA TYPE: ICD-10-CM

## 2021-09-12 DIAGNOSIS — G43.909 MIGRAINE HEADACHE: Primary | ICD-10-CM

## 2021-09-12 LAB
ALBUMIN SERPL BCP-MCNC: 4.1 G/DL (ref 3.5–5)
ALP SERPL-CCNC: 72 U/L (ref 46–116)
ALT SERPL W P-5'-P-CCNC: 27 U/L (ref 12–78)
ANION GAP SERPL CALCULATED.3IONS-SCNC: 12 MMOL/L (ref 4–13)
AST SERPL W P-5'-P-CCNC: 19 U/L (ref 5–45)
BASOPHILS # BLD AUTO: 0.02 THOUSANDS/ΜL (ref 0–0.1)
BASOPHILS NFR BLD AUTO: 0 % (ref 0–1)
BILIRUB SERPL-MCNC: 0.33 MG/DL (ref 0.2–1)
BUN SERPL-MCNC: 9 MG/DL (ref 5–25)
CALCIUM SERPL-MCNC: 9.2 MG/DL (ref 8.3–10.1)
CHLORIDE SERPL-SCNC: 105 MMOL/L (ref 100–108)
CO2 SERPL-SCNC: 22 MMOL/L (ref 21–32)
CREAT SERPL-MCNC: 0.57 MG/DL (ref 0.6–1.3)
EOSINOPHIL # BLD AUTO: 0.03 THOUSAND/ΜL (ref 0–0.61)
EOSINOPHIL NFR BLD AUTO: 1 % (ref 0–6)
ERYTHROCYTE [DISTWIDTH] IN BLOOD BY AUTOMATED COUNT: 16.5 % (ref 11.6–15.1)
GFR SERPL CREATININE-BSD FRML MDRD: 112 ML/MIN/1.73SQ M
GLUCOSE SERPL-MCNC: 110 MG/DL (ref 65–140)
GLUCOSE SERPL-MCNC: 95 MG/DL (ref 65–140)
HCG SERPL QL: NEGATIVE
HCT VFR BLD AUTO: 30.3 % (ref 34.8–46.1)
HGB BLD-MCNC: 9.2 G/DL (ref 11.5–15.4)
IMM GRANULOCYTES # BLD AUTO: 0.02 THOUSAND/UL (ref 0–0.2)
IMM GRANULOCYTES NFR BLD AUTO: 0 % (ref 0–2)
LYMPHOCYTES # BLD AUTO: 1.79 THOUSANDS/ΜL (ref 0.6–4.47)
LYMPHOCYTES NFR BLD AUTO: 38 % (ref 14–44)
MCH RBC QN AUTO: 23.1 PG (ref 26.8–34.3)
MCHC RBC AUTO-ENTMCNC: 30.4 G/DL (ref 31.4–37.4)
MCV RBC AUTO: 76 FL (ref 82–98)
MONOCYTES # BLD AUTO: 0.38 THOUSAND/ΜL (ref 0.17–1.22)
MONOCYTES NFR BLD AUTO: 8 % (ref 4–12)
NEUTROPHILS # BLD AUTO: 2.46 THOUSANDS/ΜL (ref 1.85–7.62)
NEUTS SEG NFR BLD AUTO: 53 % (ref 43–75)
NRBC BLD AUTO-RTO: 0 /100 WBCS
PLATELET # BLD AUTO: 417 THOUSANDS/UL (ref 149–390)
PMV BLD AUTO: 9.9 FL (ref 8.9–12.7)
POTASSIUM SERPL-SCNC: 3.4 MMOL/L (ref 3.5–5.3)
PROT SERPL-MCNC: 7.5 G/DL (ref 6.4–8.2)
RBC # BLD AUTO: 3.98 MILLION/UL (ref 3.81–5.12)
SODIUM SERPL-SCNC: 139 MMOL/L (ref 136–145)
TROPONIN I SERPL-MCNC: <0.02 NG/ML
WBC # BLD AUTO: 4.7 THOUSAND/UL (ref 4.31–10.16)

## 2021-09-12 PROCEDURE — 96375 TX/PRO/DX INJ NEW DRUG ADDON: CPT

## 2021-09-12 PROCEDURE — 84703 CHORIONIC GONADOTROPIN ASSAY: CPT | Performed by: PHYSICIAN ASSISTANT

## 2021-09-12 PROCEDURE — 99284 EMERGENCY DEPT VISIT MOD MDM: CPT

## 2021-09-12 PROCEDURE — 80053 COMPREHEN METABOLIC PANEL: CPT | Performed by: EMERGENCY MEDICINE

## 2021-09-12 PROCEDURE — 99284 EMERGENCY DEPT VISIT MOD MDM: CPT | Performed by: PHYSICIAN ASSISTANT

## 2021-09-12 PROCEDURE — 36415 COLL VENOUS BLD VENIPUNCTURE: CPT | Performed by: EMERGENCY MEDICINE

## 2021-09-12 PROCEDURE — 84484 ASSAY OF TROPONIN QUANT: CPT | Performed by: EMERGENCY MEDICINE

## 2021-09-12 PROCEDURE — 82948 REAGENT STRIP/BLOOD GLUCOSE: CPT

## 2021-09-12 PROCEDURE — 85025 COMPLETE CBC W/AUTO DIFF WBC: CPT | Performed by: EMERGENCY MEDICINE

## 2021-09-12 PROCEDURE — 96365 THER/PROPH/DIAG IV INF INIT: CPT

## 2021-09-12 PROCEDURE — 70450 CT HEAD/BRAIN W/O DYE: CPT

## 2021-09-12 PROCEDURE — 93005 ELECTROCARDIOGRAM TRACING: CPT

## 2021-09-12 RX ORDER — KETOROLAC TROMETHAMINE 30 MG/ML
30 INJECTION, SOLUTION INTRAMUSCULAR; INTRAVENOUS ONCE
Status: COMPLETED | OUTPATIENT
Start: 2021-09-12 | End: 2021-09-12

## 2021-09-12 RX ORDER — ACETAMINOPHEN 325 MG/1
650 TABLET ORAL ONCE
Status: DISCONTINUED | OUTPATIENT
Start: 2021-09-12 | End: 2021-09-13 | Stop reason: HOSPADM

## 2021-09-12 RX ORDER — SENNOSIDES 8.6 MG
650 CAPSULE ORAL EVERY 8 HOURS PRN
Qty: 9 TABLET | Refills: 0 | Status: SHIPPED | OUTPATIENT
Start: 2021-09-12 | End: 2021-09-15

## 2021-09-12 RX ORDER — DEXAMETHASONE SODIUM PHOSPHATE 4 MG/ML
10 INJECTION, SOLUTION INTRA-ARTICULAR; INTRALESIONAL; INTRAMUSCULAR; INTRAVENOUS; SOFT TISSUE ONCE
Status: COMPLETED | OUTPATIENT
Start: 2021-09-12 | End: 2021-09-12

## 2021-09-12 RX ORDER — MAGNESIUM SULFATE HEPTAHYDRATE 40 MG/ML
2 INJECTION, SOLUTION INTRAVENOUS ONCE
Status: COMPLETED | OUTPATIENT
Start: 2021-09-12 | End: 2021-09-12

## 2021-09-12 RX ORDER — IBUPROFEN 400 MG/1
400 TABLET ORAL EVERY 6 HOURS PRN
Qty: 12 TABLET | Refills: 0 | Status: SHIPPED | OUTPATIENT
Start: 2021-09-12 | End: 2021-09-15

## 2021-09-12 RX ORDER — METOCLOPRAMIDE HYDROCHLORIDE 5 MG/ML
10 INJECTION INTRAMUSCULAR; INTRAVENOUS ONCE
Status: COMPLETED | OUTPATIENT
Start: 2021-09-12 | End: 2021-09-12

## 2021-09-12 RX ORDER — DIPHENHYDRAMINE HYDROCHLORIDE 50 MG/ML
25 INJECTION INTRAMUSCULAR; INTRAVENOUS ONCE
Status: COMPLETED | OUTPATIENT
Start: 2021-09-12 | End: 2021-09-12

## 2021-09-12 RX ADMIN — MAGNESIUM SULFATE HEPTAHYDRATE 2 G: 40 INJECTION, SOLUTION INTRAVENOUS at 21:34

## 2021-09-12 RX ADMIN — KETOROLAC TROMETHAMINE 30 MG: 30 INJECTION, SOLUTION INTRAMUSCULAR; INTRAVENOUS at 21:26

## 2021-09-12 RX ADMIN — SODIUM CHLORIDE 1000 ML: 0.9 INJECTION, SOLUTION INTRAVENOUS at 21:27

## 2021-09-12 RX ADMIN — DEXAMETHASONE SODIUM PHOSPHATE 10 MG: 4 INJECTION INTRA-ARTICULAR; INTRALESIONAL; INTRAMUSCULAR; INTRAVENOUS; SOFT TISSUE at 21:29

## 2021-09-12 RX ADMIN — DIPHENHYDRAMINE HYDROCHLORIDE 25 MG: 50 INJECTION, SOLUTION INTRAMUSCULAR; INTRAVENOUS at 21:25

## 2021-09-12 RX ADMIN — METOCLOPRAMIDE HYDROCHLORIDE 10 MG: 5 INJECTION INTRAMUSCULAR; INTRAVENOUS at 21:25

## 2021-09-13 LAB
ATRIAL RATE: 82 BPM
QRS AXIS: 19 DEGREES
QRSD INTERVAL: 72 MS
QT INTERVAL: 376 MS
QTC INTERVAL: 423 MS
T WAVE AXIS: 34 DEGREES
VENTRICULAR RATE: 76 BPM

## 2021-09-13 PROCEDURE — 93010 ELECTROCARDIOGRAM REPORT: CPT | Performed by: INTERNAL MEDICINE

## 2021-09-13 NOTE — ED PROVIDER NOTES
History  Chief Complaint   Patient presents with    Headache     pt presents with headache since this morning, per daughter more difficult to arouse  pt answering appropriately when questioned  also reports vomiting     Patient is a 63-year-old female with history of migraines the presents emergency department with gradual onset dull aching frontal head pain with radiation to generalized head for 1 day  Patient has associated symptomatology of nausea vomiting symptoms beginning the current ED presentation head pain  Patient states that she has a history of migraines and current head pain symptomatology do not resemble headaches previously experienced  Patient reports having taking Nexium this morning with patient's daughter that is at bedside, stating that patient had become disoriented", after taking aforementioned medication  Patient daughter denies patient slurred speech, and difficulty in gait  Patient does verbalize bilateral upper extremity tingling sensation but denies numbness and loss of power in all extremities  Patient denies tinnitus, dizziness, meningeal, and vertiginous symptoms  Patient denies recent fall or recent trauma  Patient denies recent sick contacts or recent travel  Patient denies chest pain, shortness of breath, and abdominal pain  History provided by:  Patient   used: No        Prior to Admission Medications   Prescriptions Last Dose Informant Patient Reported?  Taking?   ferrous sulfate 325 (65 Fe) mg tablet   No No   Sig: Take 1 tablet (325 mg total) by mouth daily for 30 days   naproxen (NAPROSYN) 500 mg tablet   Yes No   Sig: Take 250 mg by mouth 2 (two) times a day with meals   polyethylene glycol (GLYCOLAX) 17 GM/SCOOP powder   No No   Sig: Take 17 g by mouth daily   valACYclovir (VALTREX) 1,000 mg tablet   Yes No   Sig: Take 1,000 mg by mouth 2 (two) times a day      Facility-Administered Medications: None       Past Medical History:   Diagnosis Date    Migraine        History reviewed  No pertinent surgical history  History reviewed  No pertinent family history  I have reviewed and agree with the history as documented  E-Cigarette/Vaping     E-Cigarette/Vaping Substances     Social History     Tobacco Use    Smoking status: Never Smoker    Smokeless tobacco: Never Used   Substance Use Topics    Alcohol use: No    Drug use: No       Review of Systems   Constitutional: Negative for activity change, appetite change, chills and fever  HENT: Negative for congestion, postnasal drip, rhinorrhea, sinus pressure, sinus pain, sore throat and tinnitus  Eyes: Negative for photophobia and visual disturbance  Respiratory: Negative for cough, chest tightness and shortness of breath  Cardiovascular: Negative for chest pain and palpitations  Gastrointestinal: Positive for nausea and vomiting  Negative for abdominal pain, constipation and diarrhea  Genitourinary: Negative for difficulty urinating, dysuria, flank pain, frequency and urgency  Musculoskeletal: Negative for back pain, gait problem, neck pain and neck stiffness  Skin: Negative for pallor and rash  Allergic/Immunologic: Negative for environmental allergies and food allergies  Neurological: Positive for headaches  Negative for dizziness, weakness and numbness  Psychiatric/Behavioral: Negative for confusion  All other systems reviewed and are negative  Physical Exam  Physical Exam  Vitals and nursing note reviewed  Constitutional:       General: She is awake  Appearance: Normal appearance  She is well-developed  She is not ill-appearing, toxic-appearing or diaphoretic  Comments: /70 (BP Location: Left arm)   Pulse 72   Temp 97 5 °F (36 4 °C) (Oral)   Resp 12   SpO2 100%      HENT:      Head: Normocephalic and atraumatic  Right Ear: Hearing and external ear normal  No decreased hearing noted  No drainage, swelling or tenderness   No mastoid tenderness  Left Ear: Hearing and external ear normal  No decreased hearing noted  No drainage, swelling or tenderness  No mastoid tenderness  Nose: Nose normal       Mouth/Throat:      Lips: Pink  Mouth: Mucous membranes are moist       Pharynx: Oropharynx is clear  Uvula midline  Eyes:      General: Lids are normal  Vision grossly intact  Right eye: No discharge  Left eye: No discharge  Extraocular Movements: Extraocular movements intact  Conjunctiva/sclera: Conjunctivae normal       Pupils: Pupils are equal, round, and reactive to light  Neck:      Vascular: No JVD  Trachea: Trachea and phonation normal  No tracheal tenderness or tracheal deviation  Comments: No tenderness with passive and active cervical ROM with head turning approximately 45 degree angles to the left and right  No cervical tenderness with cranial axial loading    Cardiovascular:      Rate and Rhythm: Normal rate and regular rhythm  Pulses: Normal pulses  Radial pulses are 2+ on the right side and 2+ on the left side  Posterior tibial pulses are 2+ on the right side and 2+ on the left side  Heart sounds: Normal heart sounds  Pulmonary:      Effort: Pulmonary effort is normal       Breath sounds: Normal breath sounds  No stridor  No decreased breath sounds, wheezing, rhonchi or rales  Chest:      Chest wall: No tenderness  Abdominal:      General: Abdomen is flat  Bowel sounds are normal  There is no distension  Palpations: Abdomen is soft  Abdomen is not rigid  Tenderness: There is no abdominal tenderness  There is no guarding or rebound  Musculoskeletal:         General: Normal range of motion  Cervical back: Full passive range of motion without pain, normal range of motion and neck supple  No rigidity  No spinous process tenderness or muscular tenderness  Normal range of motion        Comments: Passive ROM intact  Upper and lower extremity 4/5 bilaterally  Neurovascularly intact  No grinding or clicking of joints     Lymphadenopathy:      Head:      Right side of head: No submental, submandibular, tonsillar, preauricular, posterior auricular or occipital adenopathy  Left side of head: No submental, submandibular, tonsillar, preauricular, posterior auricular or occipital adenopathy  Cervical: No cervical adenopathy  Right cervical: No superficial, deep or posterior cervical adenopathy  Left cervical: No superficial, deep or posterior cervical adenopathy  Skin:     General: Skin is warm  Capillary Refill: Capillary refill takes less than 2 seconds  Neurological:      General: No focal deficit present  Mental Status: She is alert and oriented to person, place, and time  GCS: GCS eye subscore is 4  GCS verbal subscore is 5  GCS motor subscore is 6  Cranial Nerves: Cranial nerves are intact  Sensory: Sensation is intact  No sensory deficit  Motor: Motor function is intact  Coordination: Coordination is intact  Gait: Gait is intact  Deep Tendon Reflexes: Reflexes are normal and symmetric  Reflex Scores:       Patellar reflexes are 2+ on the right side and 2+ on the left side  Psychiatric:         Mood and Affect: Mood normal          Speech: Speech normal          Behavior: Behavior normal  Behavior is cooperative  Thought Content:  Thought content normal          Judgment: Judgment normal          Vital Signs  ED Triage Vitals   Temperature Pulse Respirations Blood Pressure SpO2   09/12/21 2054 09/12/21 2052 09/12/21 2052 09/12/21 2052 09/12/21 2052   97 5 °F (36 4 °C) 72 12 138/70 100 %      Temp Source Heart Rate Source Patient Position - Orthostatic VS BP Location FiO2 (%)   09/12/21 2054 09/12/21 2052 09/12/21 2052 09/12/21 2052 --   Oral Monitor Sitting Left arm       Pain Score       --                  Vitals:    09/12/21 2052   BP: 138/70   Pulse: 72   Patient Position - Orthostatic VS: Sitting         Visual Acuity      ED Medications  Medications   acetaminophen (TYLENOL) tablet 650 mg (650 mg Oral Not Given 9/12/21 2126)   sodium chloride 0 9 % bolus 1,000 mL (0 mL Intravenous Stopped 9/12/21 2259)   diphenhydrAMINE (BENADRYL) injection 25 mg (25 mg Intravenous Given 9/12/21 2125)   metoclopramide (REGLAN) injection 10 mg (10 mg Intravenous Given 9/12/21 2125)   ketorolac (TORADOL) injection 30 mg (30 mg Intravenous Given 9/12/21 2126)   magnesium sulfate 2 g/50 mL IVPB (premix) 2 g (0 g Intravenous Stopped 9/12/21 2259)   dexamethasone (DECADRON) injection 10 mg (10 mg Intravenous Given 9/12/21 2129)       Diagnostic Studies  Results Reviewed     Procedure Component Value Units Date/Time    hCG, qualitative pregnancy [[id number]]  (Normal) Collected: 09/12/21 2059    Lab Status: Final result Specimen: Blood from Arm, Right Updated: 09/12/21 2209     Preg, Serum Negative    Comprehensive metabolic panel [[id number]]  (Abnormal) Collected: 09/12/21 2059    Lab Status: Final result Specimen: Blood from Arm, Right Updated: 09/12/21 2133     Sodium 139 mmol/L      Potassium 3 4 mmol/L      Chloride 105 mmol/L      CO2 22 mmol/L      ANION GAP 12 mmol/L      BUN 9 mg/dL      Creatinine 0 57 mg/dL      Glucose 110 mg/dL      Calcium 9 2 mg/dL      AST 19 U/L      ALT 27 U/L      Alkaline Phosphatase 72 U/L      Total Protein 7 5 g/dL      Albumin 4 1 g/dL      Total Bilirubin 0 33 mg/dL      eGFR 112 ml/min/1 73sq m     Narrative:      Bryan guidelines for Chronic Kidney Disease (CKD):     Stage 1 with normal or high GFR (GFR > 90 mL/min/1 73 square meters)    Stage 2 Mild CKD (GFR = 60-89 mL/min/1 73 square meters)    Stage 3A Moderate CKD (GFR = 45-59 mL/min/1 73 square meters)    Stage 3B Moderate CKD (GFR = 30-44 mL/min/1 73 square meters)    Stage 4 Severe CKD (GFR = 15-29 mL/min/1 73 square meters)    Stage 5 End Stage CKD (GFR <15 mL/min/1 73 square meters)  Note: GFR calculation is accurate only with a steady state creatinine    Troponin I [[id number]]  (Normal) Collected: 09/12/21 2059    Lab Status: Final result Specimen: Blood from Arm, Right Updated: 09/12/21 2125     Troponin I <0 02 ng/mL     CBC and differential [[id number]]  (Abnormal) Collected: 09/12/21 2059    Lab Status: Final result Specimen: Blood from Arm, Right Updated: 09/12/21 2112     WBC 4 70 Thousand/uL      RBC 3 98 Million/uL      Hemoglobin 9 2 g/dL      Hematocrit 30 3 %      MCV 76 fL      MCH 23 1 pg      MCHC 30 4 g/dL      RDW 16 5 %      MPV 9 9 fL      Platelets 763 Thousands/uL      nRBC 0 /100 WBCs      Neutrophils Relative 53 %      Immat GRANS % 0 %      Lymphocytes Relative 38 %      Monocytes Relative 8 %      Eosinophils Relative 1 %      Basophils Relative 0 %      Neutrophils Absolute 2 46 Thousands/µL      Immature Grans Absolute 0 02 Thousand/uL      Lymphocytes Absolute 1 79 Thousands/µL      Monocytes Absolute 0 38 Thousand/µL      Eosinophils Absolute 0 03 Thousand/µL      Basophils Absolute 0 02 Thousands/µL     Fingerstick Glucose (POCT) [[id number]]  (Normal) Collected: 09/12/21 2102    Lab Status: Final result Updated: 09/12/21 2103     POC Glucose 95 mg/dl     POCT pregnancy, urine [[id number]]     Lab Status: No result                  CT head wo contrast   ED Interpretation by Bárbara Larry PA-C (09/12 2327)   Jos Langston MD  [phone] 9/12/2021     Narrative & Impression  CT BRAIN - WITHOUT CONTRAST     INDICATION:   generalized head pain current headache different from those previously experienced        COMPARISON:  None      TECHNIQUE:  CT examination of the brain was performed  In addition to axial images, sagittal and coronal 2D reformatted images were created and submitted for interpretation      Radiation dose length product (DLP) for this visit:  888 mGy-cm     This examination, like all CT scans performed in the [facility], was performed utilizing techniques to minimize radiation dose exposure, including the use of iterative   reconstruction and automated exposure control        IMAGE QUALITY:  Diagnostic      FINDINGS:     PARENCHYMA:  No intracranial mass, mass effect or midline shift  No CT signs of acute infarction  No acute parenchymal hemorrhage      VENTRICLES AND EXTRA-AXIAL SPACES:  Normal for the patient's age      VISUALIZED ORBITS AND PARANASAL SI   NUSES:  Unremarkable      CALVARIUM AND EXTRACRANIAL SOFT TISSUES:  Normal      IMPRESSION:     No acute intracranial hemorrhage, midline shift, or mass effect                  Workstation performed: [id number]        Final Result by Farideh Sharp MD (09/12 2320)      No acute intracranial hemorrhage, midline shift, or mass effect  Workstation performed: [id number]                    Procedures  ECG 12 Lead Documentation Only    Date/Time: 9/12/2021 8:58 PM  Performed by: Lizz Sanches PA-C  Authorized by: Lizz Sanches PA-C     Indications / Diagnosis:  Headache  ECG reviewed by me, the ED Provider: yes    Patient location:  ED  Previous ECG:     Previous ECG:  Unavailable    Comparison to cardiac monitor: Yes    Interpretation:     Interpretation: normal    Rate:     ECG rate:  76    ECG rate assessment: normal    Rhythm:     Rhythm: sinus rhythm    Ectopy:     Ectopy: none    QRS:     QRS axis:  Normal    QRS intervals:  Normal  Conduction:     Conduction: normal    ST segments:     ST segments:  Normal  T waves:     T waves: normal               ED Course  ED Course as of Sep 13 0121   Sun Sep 12, 2021   2137 Iron deficiency anemia; mcv 76; hb 9 2/hct 30 3      2205 Reassessment of patient with patient neurologically intact no focal deficits; patient verbalizes complete abatement of head pain symptomatology status post medication delivery  2307 Patient states that she has a history of iron deficiency anemia                      Stroke Assessment [address] Name 09/12/21 2113             NIH Stroke Scale    Interval  Baseline      Level of Consciousness (1a )  0      LOC Questions (1b )  0      LOC Commands (1c )  0      Best Gaze (2 )  0      Visual (3 )  0      Facial Palsy (4 )  0      Motor Arm, Left (5a )  0      Motor Arm, Right (5b )  0      Motor Leg, Left (6a )  0      Motor Leg, Right (6b )  0      Limb Ataxia (7 )  0      Sensory (8 )  0      Best Language (9 )  0      Dysarthria (10 )  0      Extinction and Inattention (11 ) (Formerly Neglect)  0      Total  0                                  MDM  Number of Diagnoses or Management Options  Migraine headache: new and does not require workup     Amount and/or Complexity of Data Reviewed  Clinical lab tests: ordered and reviewed  Tests in the radiology section of CPT®: ordered and reviewed  Review and summarize past medical records: yes  Independent visualization of images, tracings, or specimens: yes    Risk of Complications, Morbidity, and/or Mortality  Presenting problems: moderate  Diagnostic procedures: moderate  Management options: low    Patient Progress  Patient progress: stable     Patient is a 14-year-old female with history of migraines the presents emergency department with gradual onset dull aching frontal head pain with radiation to generalized head for 1 day  Patient has associated symptomatology of nausea vomiting symptoms beginning the current ED presentation head pain     Patient hemodynamically stable and afebrile  NIH stroke scale 0; reassessment patient with patient baseline mentation with normal neurological exam no focal deficits  Delivered Toradol, Reglan, Benadryl, Decadron, Tylenol with patient verbalized decrease in head pain symptoms status post medication delivery  HCG qualitative pregnancy negative  ECG with normal sinus rhythm; negative troponin  Normal glucose  Head CT non-contrast-impression-"  Unremarkable electrolytes, normal kidney function  No leukocytosis, no banding  Patient states that she has a history of iron deficiency anemia; MCV 76 with hemoglobin 9 2 with hematocrit 30 3; patient denies hematochezia, hemoptysis, and hematuria; patient also denies alternate aspects of bleeding symptomatology  Prescribed Motrin and Tylenol and counseled patient on medication administration and side effects  Follow-up with Urology, PCP and emergency department symptoms persist or exacerbate  Patient demonstrates verbal understanding all clinical laboratory imaging findings, discharge instructions, follow-up verbalized agreement patient current treatment plan with Bulgarian to English translation via daughter    Disposition  Final diagnoses:   Migraine headache   Iron deficiency anemia, unspecified iron deficiency anemia type     Time reflects when diagnosis was documented in both MDM as applicable and the Disposition within this note     Time User Action Codes Description Comment    9/12/2021 11:28 PM Jackie Sons Add [R42] Dizziness     9/12/2021 11:28 PM Jackie Sons Remove [R42] Dizziness     9/12/2021 11:28 PM Jackie Sons Add [G43 909] Migraine headache     9/12/2021 11:29 PM Jackie Sons Add [D50 9] Iron deficiency anemia, unspecified iron deficiency anemia type       ED Disposition     ED Disposition Condition Date/Time Comment    Discharge Stable Sun Sep 12, 2021 11:30 PM Pierre Natalie discharge to home/self care              Follow-up Information     Follow up With Specialties Details Why Contact Info Additional [address]   [address] [id number]  , [place], [place], [address] Emergency Department Emergency Medicine   [address] Emergency Department, [address], [place], [place], [address] Neurology [facility] Neurology   [address] [facility] [place]  [phone] Neurology [facility], [address] [place], [place], [address]          Discharge Medication List as of 9/12/2021 11:32 PM      START taking these medications    Details   acetaminophen (TYLENOL) 650 mg CR tablet Take 1 tablet (650 mg total) by mouth every 8 (eight) hours as needed for mild pain for up to 3 days, Starting Sun 9/12/2021, Until Wed 9/15/2021 at 2359, Normal      ibuprofen (MOTRIN) 400 mg tablet Take 1 tablet (400 mg total) by mouth every 6 (six) hours as needed for mild pain for up to 3 days, Starting Sun 9/12/2021, Until Wed 9/15/2021 at 2359, Normal         CONTINUE these medications which have NOT CHANGED    Details   ferrous sulfate 325 (65 Fe) mg tablet Take 1 tablet (325 mg total) by mouth daily for 30 days, Starting Mon 1/21/2019, Until Wed 2/20/2019, Print      naproxen (NAPROSYN) 500 mg tablet Take 250 mg by mouth 2 (two) times a day with meals, Historical Med      polyethylene glycol (GLYCOLAX) 17 GM/SCOOP powder Take 17 g by mouth daily, Starting u 7/1/2021, Print      valACYclovir (VALTREX) 1,000 mg tablet Take 1,000 mg by mouth 2 (two) times a day, Historical Med           No discharge procedures on file      PDMP Review     None          ED Provider  Electronically Signed by           Ramez Up PA-C  09/13/21 5725

## 2021-09-13 NOTE — DISCHARGE INSTRUCTIONS
Take Tylenol Motrin as indicated  Follow-up with Neurology, PCP and emergency department if symptoms persist or exacerbate

## 2024-03-31 ENCOUNTER — HOSPITAL ENCOUNTER (EMERGENCY)
Facility: HOSPITAL | Age: 49
Discharge: HOME | End: 2024-03-31
Attending: EMERGENCY MEDICINE
Payer: COMMERCIAL

## 2024-03-31 VITALS
HEIGHT: 62 IN | RESPIRATION RATE: 18 BRPM | TEMPERATURE: 98.2 F | BODY MASS INDEX: 23.81 KG/M2 | WEIGHT: 129.41 LBS | DIASTOLIC BLOOD PRESSURE: 75 MMHG | HEART RATE: 67 BPM | SYSTOLIC BLOOD PRESSURE: 116 MMHG | OXYGEN SATURATION: 97 %

## 2024-03-31 DIAGNOSIS — N39.0 UTI (URINARY TRACT INFECTION): Primary | ICD-10-CM

## 2024-03-31 LAB
BACTERIA UR QL AUTO: NORMAL /HPF
BILIRUB UR QL STRIP: NEGATIVE
CLARITY UR: CLEAR
COLOR UR: ABNORMAL
EXT PREGNANCY TEST URINE: NEGATIVE
EXT. CONTROL: NORMAL
GLUCOSE UR STRIP-MCNC: ABNORMAL MG/DL
HGB UR QL STRIP.AUTO: ABNORMAL
KETONES UR STRIP-MCNC: NEGATIVE MG/DL
LEUKOCYTE ESTERASE UR QL STRIP: NEGATIVE
NITRITE UR QL STRIP: POSITIVE
NON-SQ EPI CELLS URNS QL MICRO: NORMAL /HPF
PH UR STRIP.AUTO: 5.5 [PH]
PROT UR STRIP-MCNC: ABNORMAL MG/DL
RBC #/AREA URNS AUTO: NORMAL /HPF
SP GR UR STRIP.AUTO: >=1.03 (ref 1–1.03)
UROBILINOGEN UR QL STRIP.AUTO: 4 E.U./DL
WBC #/AREA URNS AUTO: NORMAL /HPF

## 2024-03-31 PROCEDURE — 81001 URINALYSIS AUTO W/SCOPE: CPT | Performed by: EMERGENCY MEDICINE

## 2024-03-31 PROCEDURE — 99283 EMERGENCY DEPT VISIT LOW MDM: CPT

## 2024-03-31 PROCEDURE — 81025 URINE PREGNANCY TEST: CPT | Performed by: EMERGENCY MEDICINE

## 2024-03-31 PROCEDURE — 99284 EMERGENCY DEPT VISIT MOD MDM: CPT | Performed by: EMERGENCY MEDICINE

## 2024-03-31 RX ORDER — ACETAMINOPHEN 325 MG/1
650 TABLET ORAL ONCE
Status: COMPLETED | OUTPATIENT
Start: 2024-03-31 | End: 2024-03-31

## 2024-03-31 RX ORDER — SULFAMETHOXAZOLE AND TRIMETHOPRIM 800; 160 MG/1; MG/1
1 TABLET ORAL 2 TIMES DAILY
Qty: 14 TABLET | Refills: 0 | Status: SHIPPED | OUTPATIENT
Start: 2024-03-31 | End: 2024-04-07

## 2024-03-31 RX ORDER — SULFAMETHOXAZOLE AND TRIMETHOPRIM 800; 160 MG/1; MG/1
1 TABLET ORAL ONCE
Status: COMPLETED | OUTPATIENT
Start: 2024-03-31 | End: 2024-03-31

## 2024-03-31 RX ADMIN — ACETAMINOPHEN 650 MG: 325 TABLET, FILM COATED ORAL at 15:58

## 2024-03-31 RX ADMIN — SULFAMETHOXAZOLE AND TRIMETHOPRIM 1 TABLET: 800; 160 TABLET ORAL at 16:38

## 2024-04-02 NOTE — ED PROVIDER NOTES
History  Chief Complaint   Patient presents with    Possible UTI     Reports dysuria, frequency, urgency, symptoms x2 days     This is a 48 presents to the emergency department complaining of frequency urgency and dysuria.  The patient states she had symptoms approximately 1 week ago and was treated with antibiotics.  She states the symptoms have gone away but then have recurred.  She denies fevers or chills.  She denies nausea or vomiting.  She denies back pain.        Prior to Admission Medications   Prescriptions Last Dose Informant Patient Reported? Taking?   ferrous sulfate 325 (65 Fe) mg tablet   No No   Sig: Take 1 tablet (325 mg total) by mouth daily for 30 days   ibuprofen (MOTRIN) 400 mg tablet   No No   Sig: Take 1 tablet (400 mg total) by mouth every 6 (six) hours as needed for mild pain for up to 3 days   naproxen (NAPROSYN) 500 mg tablet   Yes No   Sig: Take 250 mg by mouth 2 (two) times a day with meals   polyethylene glycol (GLYCOLAX) 17 GM/SCOOP powder   No No   Sig: Take 17 g by mouth daily   valACYclovir (VALTREX) 1,000 mg tablet   Yes No   Sig: Take 1,000 mg by mouth 2 (two) times a day      Facility-Administered Medications: None       Past Medical History:   Diagnosis Date    Diabetes mellitus (HCC)     Migraine        History reviewed. No pertinent surgical history.    History reviewed. No pertinent family history.  I have reviewed and agree with the history as documented.    E-Cigarette/Vaping    E-Cigarette Use Never User      E-Cigarette/Vaping Substances     Social History     Tobacco Use    Smoking status: Never    Smokeless tobacco: Never   Vaping Use    Vaping status: Never Used   Substance Use Topics    Alcohol use: No    Drug use: No       Review of Systems   All other systems reviewed and are negative.      Physical Exam  Physical Exam  Constitutional:  Vital signs reviewed, patient appears nontoxic, no acute distress  Eyes: Pupils equal round reactive to light and accommodation,  extraocular muscles intact  HEENT: trachea midline, no JVD, moist mucous membranes  Respiratory: lung sounds clear throughout, no rhonchi, no rales  Cardiovascular: regular rate rhythm, no murmurs or rubs  Abdomen: soft, mild suprapubic tenderness , nondistended, no rebound or guarding  Back: no CVA tenderness, normal inspection  Extremities: no edema, pulses equal in all 4 extremities  Neuro: awake, alert, oriented, no focal weakness  Skin: warm, dry, intact, no rashes noted    Vital Signs  ED Triage Vitals   Temperature Pulse Respirations Blood Pressure SpO2   03/31/24 1534 03/31/24 1534 03/31/24 1534 03/31/24 1536 03/31/24 1535   98.2 °F (36.8 °C) 67 18 116/75 97 %      Temp Source Heart Rate Source Patient Position - Orthostatic VS BP Location FiO2 (%)   03/31/24 1534 03/31/24 1534 03/31/24 1535 03/31/24 1535 --   Oral Monitor Lying Left arm       Pain Score       03/31/24 1535       9           Vitals:    03/31/24 1534 03/31/24 1535 03/31/24 1536   BP:   116/75   Pulse: 67     Patient Position - Orthostatic VS:  Lying          Visual Acuity      ED Medications  Medications   acetaminophen (TYLENOL) tablet 650 mg (650 mg Oral Given 3/31/24 1558)   sulfamethoxazole-trimethoprim (BACTRIM DS) 800-160 mg per tablet 1 tablet (1 tablet Oral Given 3/31/24 1638)       Diagnostic Studies  Results Reviewed       Procedure Component Value Units Date/Time    Urine Microscopic [[id number]]  (Normal) Collected: 03/31/24 1556    Lab Status: Final result Specimen: Urine, Clean Catch Updated: 03/31/24 1615     RBC, UA 0-5 /hpf      WBC, UA 0-5 /hpf      Epithelial Cells Occasional /hpf      Bacteria, UA Occasional /hpf     UA w Reflex to Microscopic w Reflex to Culture [[id number]]  (Abnormal) Collected: 03/31/24 1556    Lab Status: Final result Specimen: Urine, Clean Catch Updated: 03/31/24 1602     Color, UA Orange     Clarity, UA Clear     Specific Gravity, UA >=1.030     pH, UA 5.5     Leukocytes, UA Negative     Nitrite, UA  Positive     Protein, UA 1+ mg/dl      Glucose, UA Trace mg/dl      Ketones, UA Negative mg/dl      Urobilinogen, UA 4.0 E.U./dl      Bilirubin, UA Negative     Occult Blood, UA Trace-Intact    POCT pregnancy, urine [[id number]]  (Normal) Resulted: 03/31/24 1555    Lab Status: Final result Updated: 03/31/24 1555     EXT Preg Test, Ur Negative     Control Valid                   No orders to display              Procedures  Procedures         ED Course                                             Medical Decision Making  This is a 48-year-old female who presented to the emergency department complaining of urinary symptoms.  I considered UTI, pyelonephritis  These and other diagnoses were considered.     Patient was well-appearing on exam.  She was not tachycardic or febrile.  She had no flank tenderness.  The patient had a UA that was nitrite positive and will be treated with Bactrim for a second time.  A culture was taken and the patient will be discharged with follow-up to her primary care physician.    Amount and/or Complexity of Data Reviewed  Labs: ordered.    Risk  OTC drugs.  Prescription drug management.             Disposition  Final diagnoses:   UTI (urinary tract infection)     Time reflects when diagnosis was documented in both MDM as applicable and the Disposition within this note       Time User Action Codes Description Comment    3/31/2024  4:35 PM Ranjan Malave Add [N39.0] UTI (urinary tract infection)           ED Disposition       ED Disposition   Discharge    Condition   Stable    Date/Time   Sun Mar 31, 2024  4:35 PM    Comment   Aman Gonzalez discharge to home/self care.                   Follow-up Information       Follow up With Specialties Details Why Contact Info Additional Information    [facility] Emergency Department Emergency Medicine  If symptoms worsen [address] [place] [id number]  [phone] [facility] Emergency Department, [address] [place] [address]  [address] [place] [address]    [facility] [place] [facility] Schedule an appointment as soon as possible for a visit   [address] [facility] [address]  [facility] [id number] [phone] [facility] [place], [address] [facility] [address], [place], [place], [address]   [phone]            Discharge Medication List as of 3/31/2024  4:37 PM        START taking these medications    Details   sulfamethoxazole-trimethoprim (BACTRIM DS) 800-160 mg per tablet Take 1 tablet by mouth 2 (two) times a day for 7 days smx-tmp DS (BACTRIM) 800-160 mg tabs (1tab q12 D10), Starting Sun 3/31/2024, Until Sun 4/7/2024, Normal           CONTINUE these medications which have NOT CHANGED    Details   ferrous sulfate 325 (65 Fe) mg tablet Take 1 tablet (325 mg total) by mouth daily for 30 days, Starting Mon 1/21/2019, Until Wed 2/20/2019, Print      ibuprofen (MOTRIN) 400 mg tablet Take 1 tablet (400 mg total) by mouth every 6 (six) hours as needed for mild pain for up to 3 days, Starting Sun 9/12/2021, Until Wed 9/15/2021 at 2359, Normal      naproxen (NAPROSYN) 500 mg tablet Take 250 mg by mouth 2 (two) times a day with meals, Historical Med      polyethylene glycol (GLYCOLAX) 17 GM/SCOOP powder Take 17 g by mouth daily, Starting u 7/1/2021, Print      valACYclovir (VALTREX) 1,000 mg tablet Take 1,000 mg by mouth 2 (two) times a day, Historical Med                 PDMP Review       None            ED Provider  Electronically Signed by             Ranjan Malave DO  04/02/24 5546

## 2024-07-23 ENCOUNTER — APPOINTMENT (EMERGENCY)
Dept: RADIOLOGY | Facility: HOSPITAL | Age: 49
End: 2024-07-23
Payer: COMMERCIAL

## 2024-07-23 ENCOUNTER — HOSPITAL ENCOUNTER (EMERGENCY)
Facility: HOSPITAL | Age: 49
Discharge: HOME | End: 2024-07-23
Attending: EMERGENCY MEDICINE
Payer: COMMERCIAL

## 2024-07-23 VITALS
WEIGHT: 130 LBS | RESPIRATION RATE: 18 BRPM | BODY MASS INDEX: 23.78 KG/M2 | OXYGEN SATURATION: 97 % | HEART RATE: 64 BPM | DIASTOLIC BLOOD PRESSURE: 69 MMHG | TEMPERATURE: 98.4 F | SYSTOLIC BLOOD PRESSURE: 133 MMHG

## 2024-07-23 DIAGNOSIS — R07.9 CHEST PAIN: Primary | ICD-10-CM

## 2024-07-23 LAB
ALBUMIN SERPL BCG-MCNC: 4.8 G/DL (ref 3.5–5)
ALP SERPL-CCNC: 81 U/L (ref 34–104)
ALT SERPL W P-5'-P-CCNC: 19 U/L (ref 7–52)
ANION GAP SERPL CALCULATED.3IONS-SCNC: 6 MMOL/L (ref 4–13)
AST SERPL W P-5'-P-CCNC: 19 U/L (ref 13–39)
ATRIAL RATE: 71 BPM
BASOPHILS # BLD AUTO: 0.01 THOUSANDS/ÂΜL (ref 0–0.1)
BASOPHILS NFR BLD AUTO: 0 % (ref 0–1)
BILIRUB SERPL-MCNC: 0.8 MG/DL (ref 0.2–1)
BUN SERPL-MCNC: 14 MG/DL (ref 5–25)
CALCIUM SERPL-MCNC: 9.8 MG/DL (ref 8.4–10.2)
CARDIAC TROPONIN I PNL SERPL HS: <2 NG/L
CHLORIDE SERPL-SCNC: 105 MMOL/L (ref 96–108)
CO2 SERPL-SCNC: 28 MMOL/L (ref 21–32)
CREAT SERPL-MCNC: 0.61 MG/DL (ref 0.6–1.3)
EOSINOPHIL # BLD AUTO: 0.02 THOUSAND/ÂΜL (ref 0–0.61)
EOSINOPHIL NFR BLD AUTO: 0 % (ref 0–6)
ERYTHROCYTE [DISTWIDTH] IN BLOOD BY AUTOMATED COUNT: 11.9 % (ref 11.6–15.1)
GFR SERPL CREATININE-BSD FRML MDRD: 106 ML/MIN/1.73SQ M
GLUCOSE SERPL-MCNC: 103 MG/DL (ref 65–140)
HCT VFR BLD AUTO: 38.1 % (ref 34.8–46.1)
HGB BLD-MCNC: 13.1 G/DL (ref 11.5–15.4)
IMM GRANULOCYTES # BLD AUTO: 0.01 THOUSAND/UL (ref 0–0.2)
IMM GRANULOCYTES NFR BLD AUTO: 0 % (ref 0–2)
LYMPHOCYTES # BLD AUTO: 1.46 THOUSANDS/ÂΜL (ref 0.6–4.47)
LYMPHOCYTES NFR BLD AUTO: 24 % (ref 14–44)
MCH RBC QN AUTO: 32 PG (ref 26.8–34.3)
MCHC RBC AUTO-ENTMCNC: 34.4 G/DL (ref 31.4–37.4)
MCV RBC AUTO: 93 FL (ref 82–98)
MONOCYTES # BLD AUTO: 0.34 THOUSAND/ÂΜL (ref 0.17–1.22)
MONOCYTES NFR BLD AUTO: 6 % (ref 4–12)
NEUTROPHILS # BLD AUTO: 4.33 THOUSANDS/ÂΜL (ref 1.85–7.62)
NEUTS SEG NFR BLD AUTO: 70 % (ref 43–75)
NRBC BLD AUTO-RTO: 0 /100 WBCS
P AXIS: 61 DEGREES
PLATELET # BLD AUTO: 221 THOUSANDS/UL (ref 149–390)
PMV BLD AUTO: 10.2 FL (ref 8.9–12.7)
POTASSIUM SERPL-SCNC: 3.9 MMOL/L (ref 3.5–5.3)
PR INTERVAL: 128 MS
PROT SERPL-MCNC: 7.5 G/DL (ref 6.4–8.4)
QRS AXIS: 62 DEGREES
QRSD INTERVAL: 70 MS
QT INTERVAL: 402 MS
QTC INTERVAL: 436 MS
RBC # BLD AUTO: 4.09 MILLION/UL (ref 3.81–5.12)
SODIUM SERPL-SCNC: 139 MMOL/L (ref 135–147)
T WAVE AXIS: 56 DEGREES
VENTRICULAR RATE: 71 BPM
WBC # BLD AUTO: 6.17 THOUSAND/UL (ref 4.31–10.16)

## 2024-07-23 PROCEDURE — 71045 X-RAY EXAM CHEST 1 VIEW: CPT

## 2024-07-23 PROCEDURE — 93005 ELECTROCARDIOGRAM TRACING: CPT

## 2024-07-23 PROCEDURE — 84484 ASSAY OF TROPONIN QUANT: CPT | Performed by: EMERGENCY MEDICINE

## 2024-07-23 PROCEDURE — 99285 EMERGENCY DEPT VISIT HI MDM: CPT | Performed by: EMERGENCY MEDICINE

## 2024-07-23 PROCEDURE — 36415 COLL VENOUS BLD VENIPUNCTURE: CPT

## 2024-07-23 PROCEDURE — 99285 EMERGENCY DEPT VISIT HI MDM: CPT

## 2024-07-23 PROCEDURE — 80053 COMPREHEN METABOLIC PANEL: CPT | Performed by: EMERGENCY MEDICINE

## 2024-07-23 PROCEDURE — 93010 ELECTROCARDIOGRAM REPORT: CPT | Performed by: INTERNAL MEDICINE

## 2024-07-23 PROCEDURE — 96372 THER/PROPH/DIAG INJ SC/IM: CPT

## 2024-07-23 PROCEDURE — 85025 COMPLETE CBC W/AUTO DIFF WBC: CPT | Performed by: EMERGENCY MEDICINE

## 2024-07-23 RX ORDER — KETOROLAC TROMETHAMINE 30 MG/ML
30 INJECTION, SOLUTION INTRAMUSCULAR; INTRAVENOUS ONCE
Status: COMPLETED | OUTPATIENT
Start: 2024-07-23 | End: 2024-07-23

## 2024-07-23 RX ORDER — ACETAMINOPHEN 325 MG/1
650 TABLET ORAL EVERY 6 HOURS PRN
Qty: 30 TABLET | Refills: 0 | Status: SHIPPED | OUTPATIENT
Start: 2024-07-23

## 2024-07-23 RX ORDER — FAMOTIDINE 20 MG/1
20 TABLET, FILM COATED ORAL ONCE
Status: COMPLETED | OUTPATIENT
Start: 2024-07-23 | End: 2024-07-23

## 2024-07-23 RX ORDER — KETOROLAC TROMETHAMINE 30 MG/ML
15 INJECTION, SOLUTION INTRAMUSCULAR; INTRAVENOUS ONCE
Status: DISCONTINUED | OUTPATIENT
Start: 2024-07-23 | End: 2024-07-23

## 2024-07-23 RX ORDER — MAGNESIUM HYDROXIDE/ALUMINUM HYDROXICE/SIMETHICONE 120; 1200; 1200 MG/30ML; MG/30ML; MG/30ML
30 SUSPENSION ORAL ONCE
Status: COMPLETED | OUTPATIENT
Start: 2024-07-23 | End: 2024-07-23

## 2024-07-23 RX ORDER — FAMOTIDINE 10 MG/ML
20 INJECTION, SOLUTION INTRAVENOUS ONCE
Status: DISCONTINUED | OUTPATIENT
Start: 2024-07-23 | End: 2024-07-23

## 2024-07-23 RX ORDER — LIDOCAINE HYDROCHLORIDE 20 MG/ML
15 SOLUTION OROPHARYNGEAL ONCE
Status: COMPLETED | OUTPATIENT
Start: 2024-07-23 | End: 2024-07-23

## 2024-07-23 RX ADMIN — LIDOCAINE HYDROCHLORIDE 15 ML: 20 SOLUTION ORAL; TOPICAL at 14:07

## 2024-07-23 RX ADMIN — KETOROLAC TROMETHAMINE 30 MG: 30 INJECTION, SOLUTION INTRAMUSCULAR at 14:08

## 2024-07-23 RX ADMIN — ALUMINUM HYDROXIDE, MAGNESIUM HYDROXIDE, DIMETHICONE 30 ML: 200; 20; 200 SUSPENSION ORAL at 14:06

## 2024-07-23 RX ADMIN — FAMOTIDINE 20 MG: 20 TABLET, FILM COATED ORAL at 14:05

## 2024-07-23 NOTE — ED PROVIDER NOTES
History  Chief Complaint   Patient presents with    Chest Pain     Pt c/o left sided chest pressure and burning x3 days, denies SOB      49-year-old female comes in for evaluation of chest pain.  Patient states she has had left-sided chest pain that she describes as a pressure and also burning intermittently for the last 3 days.  Patient denies any previous similar episodes.  Denies any shortness of breath with it no fevers or cough.  Denies any history of heartburn.  Does not seem to be related to food.      History provided by:  Patient, medical records and relative   used: Yes    Chest Pain  Pain location:  L chest  Pain quality: burning and pressure    Pain radiates to:  Does not radiate  Pain radiates to the back: no    Pain severity:  Moderate  Onset quality:  Sudden  Duration:  3 days  Timing:  Intermittent  Progression:  Waxing and waning  Chronicity:  New  Context: at rest    Context: not raising an arm and no stress    Ineffective treatments:  None tried  Associated symptoms: no abdominal pain, no anorexia, no anxiety, no back pain, no cough, no fever, no orthopnea, no palpitations, no shortness of breath and not vomiting    Risk factors: no coronary artery disease, no hypertension and no smoking        Prior to Admission Medications   Prescriptions Last Dose Informant Patient Reported? Taking?   ferrous sulfate 325 (65 Fe) mg tablet   No No   Sig: Take 1 tablet (325 mg total) by mouth daily for 30 days   ibuprofen (MOTRIN) 400 mg tablet   No No   Sig: Take 1 tablet (400 mg total) by mouth every 6 (six) hours as needed for mild pain for up to 3 days   naproxen (NAPROSYN) 500 mg tablet   Yes No   Sig: Take 250 mg by mouth 2 (two) times a day with meals   polyethylene glycol (GLYCOLAX) 17 GM/SCOOP powder   No No   Sig: Take 17 g by mouth daily   valACYclovir (VALTREX) 1,000 mg tablet   Yes No   Sig: Take 1,000 mg by mouth 2 (two) times a day      Facility-Administered Medications: None        Past Medical History:   Diagnosis Date    Diabetes mellitus (HCC)     pt reports in pregnancy    Migraine        History reviewed. No pertinent surgical history.    History reviewed. No pertinent family history.  I have reviewed and agree with the history as documented.    E-Cigarette/Vaping    E-Cigarette Use Never User      E-Cigarette/Vaping Substances     Social History     Tobacco Use    Smoking status: Never    Smokeless tobacco: Never   Vaping Use    Vaping status: Never Used   Substance Use Topics    Alcohol use: No    Drug use: No       Review of Systems   Constitutional:  Negative for chills and fever.   HENT:  Negative for ear pain and sore throat.    Eyes:  Negative for pain and visual disturbance.   Respiratory:  Negative for cough and shortness of breath.    Cardiovascular:  Positive for chest pain. Negative for palpitations and orthopnea.   Gastrointestinal:  Negative for abdominal pain, anorexia and vomiting.   Genitourinary:  Negative for dysuria and hematuria.   Musculoskeletal:  Negative for arthralgias and back pain.   Skin:  Negative for color change and rash.   Neurological:  Negative for seizures and syncope.   All other systems reviewed and are negative.      Physical Exam  Physical Exam  Constitutional:       Appearance: She is well-developed.   HENT:      Head: Normocephalic and atraumatic.      Right Ear: External ear normal.      Left Ear: External ear normal.   Eyes:      Conjunctiva/sclera: Conjunctivae normal.      Pupils: Pupils are equal, round, and reactive to light.   Neck:      Thyroid: No thyroid mass.      Vascular: No carotid bruit or JVD.      Trachea: Trachea normal.   Cardiovascular:      Rate and Rhythm: Normal rate and regular rhythm.      Pulses: Normal pulses.      Heart sounds: Normal heart sounds, S1 normal and S2 normal.   Pulmonary:      Effort: Pulmonary effort is normal.      Breath sounds: Normal breath sounds. No wheezing, rhonchi or rales.   Abdominal:       General: Bowel sounds are normal.      Palpations: Abdomen is soft.      Tenderness: There is no abdominal tenderness. There is no guarding or rebound.   Genitourinary:     Exam position: Supine.      Vagina: No bleeding.   Musculoskeletal:         General: Normal range of motion.      Cervical back: Normal range of motion and neck supple.   Lymphadenopathy:      Cervical: No cervical adenopathy.   Skin:     General: Skin is warm and dry.   Neurological:      Mental Status: She is alert and oriented to person, place, and time.      GCS: GCS eye subscore is 4. GCS verbal subscore is 5. GCS motor subscore is 6.      Cranial Nerves: No cranial nerve deficit.      Sensory: No sensory deficit.      Deep Tendon Reflexes: Reflexes are normal and symmetric.   Psychiatric:         Speech: Speech normal.         Behavior: Behavior normal. Behavior is cooperative.         Thought Content: Thought content normal.         Vital Signs  ED Triage Vitals [07/23/24 1251]   Temperature Pulse Respirations Blood Pressure SpO2   98.4 °F (36.9 °C) 70 18 127/59 99 %      Temp Source Heart Rate Source Patient Position - Orthostatic VS BP Location FiO2 (%)   Oral Monitor Sitting Left arm --      Pain Score       8           Vitals:    07/23/24 1251 07/23/24 1415   BP: 127/59 133/69   Pulse: 70 64   Patient Position - Orthostatic VS: Sitting          Visual Acuity      ED Medications  Medications   aluminum-magnesium hydroxide-simethicone (MAALOX) oral suspension 30 mL (30 mL Oral Given 7/23/24 1406)   Lidocaine Viscous HCl (XYLOCAINE) 2 % mucosal solution 15 mL (15 mL Swish & Spit Given 7/23/24 1407)   ketorolac (TORADOL) injection 30 mg (30 mg Intramuscular Given 7/23/24 1408)   famotidine (PEPCID) tablet 20 mg (20 mg Oral Given 7/23/24 1405)       Diagnostic Studies  Results Reviewed       Procedure Component Value Units Date/Time    HS Troponin 0hr (reflex protocol) [[id number]]  (Normal) Collected: 07/23/24 1256    Lab Status: Final  result Specimen: Blood from Arm, Left Updated: 07/23/24 1334     hs TnI 0hr <2 ng/L     Comprehensive metabolic panel [[id number]] Collected: 07/23/24 1256    Lab Status: Final result Specimen: Blood from Arm, Left Updated: 07/23/24 1324     Sodium 139 mmol/L      Potassium 3.9 mmol/L      Chloride 105 mmol/L      CO2 28 mmol/L      ANION GAP 6 mmol/L      BUN 14 mg/dL      Creatinine 0.61 mg/dL      Glucose 103 mg/dL      Calcium 9.8 mg/dL      AST 19 U/L      ALT 19 U/L      Alkaline Phosphatase 81 U/L      Total Protein 7.5 g/dL      Albumin 4.8 g/dL      Total Bilirubin 0.80 mg/dL      eGFR 106 ml/min/1.73sq m     Narrative:      National Kid[employer] Foundation guidelines for Chronic Kidney Disease (CKD):     Stage 1 with normal or high GFR (GFR > 90 mL/min/1.73 square meters)    Stage 2 Mild CKD (GFR = 60-89 mL/min/1.73 square meters)    Stage 3A Moderate CKD (GFR = 45-59 mL/min/1.73 square meters)    Stage 3B Moderate CKD (GFR = 30-44 mL/min/1.73 square meters)    Stage 4 Severe CKD (GFR = 15-29 mL/min/1.73 square meters)    Stage 5 End Stage CKD (GFR <15 mL/min/1.73 square meters)  Note: GFR calculation is accurate only with a steady state creatinine    CBC and differential [[id number]] Collected: 07/23/24 1256    Lab Status: Final result Specimen: Blood from Arm, Left Updated: 07/23/24 1310     WBC 6.17 Thousand/uL      RBC 4.09 Million/uL      Hemoglobin 13.1 g/dL      Hematocrit 38.1 %      MCV 93 fL      MCH 32.0 pg      MCHC 34.4 g/dL      RDW 11.9 %      MPV 10.2 fL      Platelets 221 Thousands/uL      nRBC 0 /100 WBCs      Segmented % 70 %      Immature Grans % 0 %      Lymphocytes % 24 %      Monocytes % 6 %      Eosinophils Relative 0 %      Basophils Relative 0 %      Absolute Neutrophils 4.33 Thousands/µL      Absolute Immature Grans 0.01 Thousand/uL      Absolute Lymphocytes 1.46 Thousands/µL      Absolute Monocytes 0.34 Thousand/µL      Eosinophils Absolute 0.02 Thousand/µL      Basophils  Absolute 0.01 Thousands/µL                    XR chest 1 view portable    (Results Pending)              Procedures  ECG 12 Lead Documentation Only    Date/Time: 7/23/2024 1:02 PM    Performed by: Jenn Ramos DO  Authorized by: Jenn Ramos DO    Rate:     ECG rate:  71  Rhythm:     Rhythm: sinus rhythm    Ectopy:     Ectopy: none    QRS:     QRS axis:  Normal  Conduction:     Conduction: normal    ST segments:     ST segments:  Normal           ED Course               HEART Risk Score      Flowsheet Row Most Recent Value   Heart Score Risk Calculator    History 0 Filed at: 07/23/2024 1352   ECG 0 Filed at: 07/23/2024 1352   Age 1 Filed at: 07/23/2024 1352   Risk Factors 1 Filed at: 07/23/2024 1352   Troponin 0 Filed at: 07/23/2024 1352   HEART Score 2 Filed at: 07/23/2024 1352                  PERC Rule for PE      Flowsheet Row Most Recent Value   PERC Rule for PE    Age >=50 0 Filed at: 07/23/2024 1352   HR >=100 0 Filed at: 07/23/2024 1352   O2 Sat on room air < 95% 0 Filed at: 07/23/2024 1352   History of PE or DVT 0 Filed at: 07/23/2024 1352   Recent trauma or surgery 0 Filed at: 07/23/2024 1352   Hemoptysis 0 Filed at: 07/23/2024 1352   Exogenous estrogen 0 Filed at: 07/23/2024 1352   Unilateral leg swelling 0 Filed at: 07/23/2024 1352   PERC Rule for PE Results 0 Filed at: 07/23/2024 1352                              Medical Decision Making  Differential diagnosis includes but is not limited to ACS, pneumothorax, hemothorax, pleural effusion, musculoskeletal chest pain, costochondritis, doubt PE as patient has no shortness of breath, no pleuritic chest pain and is PERC negative    Problems Addressed:  Chest pain: acute illness or injury    Amount and/or Complexity of Data Reviewed  Labs: ordered. Decision-making details documented in ED Course.  Radiology: ordered and independent interpretation performed.     Details: Chest x-ray within normal limits no pneumothorax no hemothorax no rib  fractures no pneumonia no cardiomegaly no pleural effusion  ECG/medicine tests: ordered and independent interpretation performed. Decision-making details documented in ED Course.    Risk  OTC drugs.  Prescription drug management.                 Disposition  Final diagnoses:   Chest pain     Time reflects when diagnosis was documented in both MDM as applicable and the Disposition within this note       Time User Action Codes Description Comment    7/23/2024  2:29 PM Jenn Ramos Add [R07.9] Chest pain           ED Disposition       ED Disposition   Discharge    Condition   Stable    Date/Time   Tue Jul 23, 2024 1429    Comment   Aman Lobcamila discharge to home/self care.                   Follow-up Information       Follow up With Specialties Details Why Contact Info Additional Information    [facility] Schedule an appointment as soon as possible for a visit   [address] [place] [id number] [phone] [facility], [address] [place] [address], [address] [phone]            Patient's Medications   Discharge Prescriptions    ACETAMINOPHEN (TYLENOL) 325 MG TABLET    Take 2 tablets (650 mg total) by mouth every 6 (six) hours as needed for mild pain or moderate pain       Start Date: 7/23/2024 End Date: --       Order Dose: 650 mg       Quantity: 30 tablet    Refills: 0       No discharge procedures on file.    PDMP Review       None            ED Provider  Electronically Signed by             Jenn Ramos DO  07/23/24 1342

## 2024-07-23 NOTE — Clinical Note
Aman Gonzalez was seen and treated in our emergency department on 7/23/2024.                Diagnosis:     Aman  may return to work on return date.    She may return on this date: 07/24/2024         If you have any questions or concerns, please don't hesitate to call.      Jenn Ramos, DO    ______________________________           _______________          _______________  Hospital Representative                              Date                                Time

## 2025-01-07 ENCOUNTER — HOSPITAL ENCOUNTER (EMERGENCY)
Facility: HOSPITAL | Age: 50
Discharge: HOME | End: 2025-01-07
Attending: STUDENT IN AN ORGANIZED HEALTH CARE EDUCATION/TRAINING PROGRAM | Admitting: STUDENT IN AN ORGANIZED HEALTH CARE EDUCATION/TRAINING PROGRAM
Payer: COMMERCIAL

## 2025-01-07 VITALS
TEMPERATURE: 98.1 F | SYSTOLIC BLOOD PRESSURE: 127 MMHG | RESPIRATION RATE: 18 BRPM | OXYGEN SATURATION: 98 % | HEART RATE: 65 BPM | DIASTOLIC BLOOD PRESSURE: 61 MMHG

## 2025-01-07 DIAGNOSIS — G89.11 ACUTE TRAUMATIC PAIN: ICD-10-CM
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
## 2025-01-07 LAB
EXT PREGNANCY TEST URINE: NEGATIVE
EXT. CONTROL: NORMAL

## 2025-01-07 PROCEDURE — 99283 EMERGENCY DEPT VISIT LOW MDM: CPT | Performed by: STUDENT IN AN ORGANIZED HEALTH CARE EDUCATION/TRAINING PROGRAM

## 2025-01-07 PROCEDURE — 99284 EMERGENCY DEPT VISIT MOD MDM: CPT

## 2025-01-07 PROCEDURE — 81025 URINE PREGNANCY TEST: CPT | Performed by: STUDENT IN AN ORGANIZED HEALTH CARE EDUCATION/TRAINING PROGRAM

## 2025-01-07 RX ORDER — IBUPROFEN 400 MG/1
400 TABLET, FILM COATED ORAL ONCE
Status: COMPLETED | OUTPATIENT
Start: 2025-01-07 | End: 2025-01-07

## 2025-01-07 RX ORDER — ACETAMINOPHEN 325 MG/1
975 TABLET ORAL ONCE
Status: COMPLETED | OUTPATIENT
Start: 2025-01-07 | End: 2025-01-07

## 2025-01-07 RX ADMIN — ACETAMINOPHEN 975 MG: 325 TABLET, FILM COATED ORAL at 11:00

## 2025-01-07 RX ADMIN — IBUPROFEN 400 MG: 400 TABLET, FILM COATED ORAL at 11:00
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
## 2025-01-07 NOTE — Clinical Note
Aman Gonzalez was seen and treated in our emergency department on 1/7/2025.                Diagnosis:     Aman  may return to work on return date.    She may return on this date: 01/09/2025         If you have any questions or concerns, please don't hesitate to call.      Elayne Canada MD    ______________________________           _______________          _______________  Hospital Representative                              Date                                Time Noted.  This was discussed with patient as well.  She will continue to monitor her headache and reach out to clinic if her symptoms worsen.

## 2025-01-07 NOTE — ED NOTES
Pt ambulatory to restroom at this time to provide urine sample     Анна Mathis RN  01/07/25 1013